# Patient Record
Sex: MALE | Race: WHITE | Employment: FULL TIME | URBAN - METROPOLITAN AREA
[De-identification: names, ages, dates, MRNs, and addresses within clinical notes are randomized per-mention and may not be internally consistent; named-entity substitution may affect disease eponyms.]

---

## 2024-01-14 ENCOUNTER — HOSPITAL ENCOUNTER (OUTPATIENT)
Dept: RADIOLOGY | Facility: EXTERNAL LOCATION | Age: 34
Discharge: HOME | End: 2024-01-14

## 2024-01-14 DIAGNOSIS — R07.81 RIB PAIN ON LEFT SIDE: ICD-10-CM

## 2024-01-14 DIAGNOSIS — M25.512 LEFT SHOULDER PAIN, UNSPECIFIED CHRONICITY: ICD-10-CM

## 2024-01-17 ENCOUNTER — OFFICE VISIT (OUTPATIENT)
Dept: ORTHOPEDIC SURGERY | Facility: CLINIC | Age: 34
End: 2024-01-17
Payer: MEDICARE

## 2024-01-17 ENCOUNTER — APPOINTMENT (OUTPATIENT)
Dept: ORTHOPEDIC SURGERY | Facility: HOSPITAL | Age: 34
End: 2024-01-17
Payer: COMMERCIAL

## 2024-01-17 VITALS — WEIGHT: 202 LBS | HEIGHT: 70 IN | BODY MASS INDEX: 28.92 KG/M2

## 2024-01-17 DIAGNOSIS — S40.011A CONTUSION OF MULTIPLE SITES OF RIGHT SHOULDER AND UPPER ARM, INITIAL ENCOUNTER: ICD-10-CM

## 2024-01-17 DIAGNOSIS — S40.021A CONTUSION OF MULTIPLE SITES OF RIGHT SHOULDER AND UPPER ARM, INITIAL ENCOUNTER: ICD-10-CM

## 2024-01-17 DIAGNOSIS — M25.512 LEFT SHOULDER PAIN, UNSPECIFIED CHRONICITY: ICD-10-CM

## 2024-01-17 DIAGNOSIS — M75.82 ROTATOR CUFF TENDINITIS, LEFT: ICD-10-CM

## 2024-01-17 DIAGNOSIS — S40.012A CONTUSION OF LEFT SHOULDER, INITIAL ENCOUNTER: Primary | ICD-10-CM

## 2024-01-17 PROCEDURE — 99203 OFFICE O/P NEW LOW 30 MIN: CPT | Performed by: ORTHOPAEDIC SURGERY
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
## 2024-01-19 ENCOUNTER — APPOINTMENT (OUTPATIENT)
Dept: PHYSICAL THERAPY | Facility: CLINIC | Age: 34
End: 2024-01-19
Payer: COMMERCIAL

## 2024-01-19 ENCOUNTER — EVALUATION (OUTPATIENT)
Dept: PHYSICAL THERAPY | Facility: CLINIC | Age: 34
End: 2024-01-19
Payer: COMMERCIAL

## 2024-01-19 DIAGNOSIS — S40.012A CONTUSION OF LEFT SHOULDER, INITIAL ENCOUNTER: Primary | ICD-10-CM

## 2024-01-19 DIAGNOSIS — M75.82 ROTATOR CUFF TENDINITIS, LEFT: ICD-10-CM

## 2024-01-19 PROCEDURE — 97161 PT EVAL LOW COMPLEX 20 MIN: CPT | Mod: GP | Performed by: PHYSICAL THERAPIST

## 2024-01-19 PROCEDURE — 97110 THERAPEUTIC EXERCISES: CPT | Mod: GP | Performed by: PHYSICAL THERAPIST

## 2024-01-19 ASSESSMENT — PATIENT HEALTH QUESTIONNAIRE - PHQ9
SUM OF ALL RESPONSES TO PHQ9 QUESTIONS 1 AND 2: 0
1. LITTLE INTEREST OR PLEASURE IN DOING THINGS: NOT AT ALL
2. FEELING DOWN, DEPRESSED OR HOPELESS: NOT AT ALL

## 2024-01-19 ASSESSMENT — ENCOUNTER SYMPTOMS
DEPRESSION: 0
LOSS OF SENSATION IN FEET: 0
OCCASIONAL FEELINGS OF UNSTEADINESS: 0

## 2024-01-19 ASSESSMENT — PAIN - FUNCTIONAL ASSESSMENT: PAIN_FUNCTIONAL_ASSESSMENT: 0-10
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
## 2024-01-22 ENCOUNTER — TREATMENT (OUTPATIENT)
Dept: PHYSICAL THERAPY | Facility: CLINIC | Age: 34
End: 2024-01-22
Payer: COMMERCIAL

## 2024-01-22 DIAGNOSIS — S40.012A CONTUSION OF LEFT SHOULDER, INITIAL ENCOUNTER: ICD-10-CM

## 2024-01-22 DIAGNOSIS — M75.82 ROTATOR CUFF TENDINITIS, LEFT: ICD-10-CM

## 2024-01-22 PROCEDURE — 97110 THERAPEUTIC EXERCISES: CPT | Mod: GP | Performed by: PHYSICAL THERAPIST

## 2024-01-22 ASSESSMENT — PAIN - FUNCTIONAL ASSESSMENT: PAIN_FUNCTIONAL_ASSESSMENT: 0-10

## 2024-01-22 ASSESSMENT — PAIN SCALES - GENERAL: PAINLEVEL_OUTOF10: 5 - MODERATE PAIN
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
## 2024-01-24 ENCOUNTER — TREATMENT (OUTPATIENT)
Dept: PHYSICAL THERAPY | Facility: CLINIC | Age: 34
End: 2024-01-24
Payer: COMMERCIAL

## 2024-01-24 DIAGNOSIS — S40.012A CONTUSION OF LEFT SHOULDER, INITIAL ENCOUNTER: ICD-10-CM

## 2024-01-24 DIAGNOSIS — M75.82 ROTATOR CUFF TENDINITIS, LEFT: ICD-10-CM

## 2024-01-24 PROCEDURE — 97014 ELECTRIC STIMULATION THERAPY: CPT | Mod: GP | Performed by: PHYSICAL THERAPIST

## 2024-01-24 PROCEDURE — 97110 THERAPEUTIC EXERCISES: CPT | Mod: GP | Performed by: PHYSICAL THERAPIST

## 2024-01-24 ASSESSMENT — PAIN - FUNCTIONAL ASSESSMENT: PAIN_FUNCTIONAL_ASSESSMENT: 0-10

## 2024-01-24 ASSESSMENT — PAIN SCALES - GENERAL: PAINLEVEL_OUTOF10: 5 - MODERATE PAIN

## 2024-01-24 NOTE — PROGRESS NOTES
Physical Therapy        Physical Therapy    Physical Therapy Treatment      Patient Name: Jj Avitia  MRN: [id number]  Today's Date: 1/24/2024  Time Calculation  Start Time: 0825  Stop Time: 0908  Time Calculation (min): 43 min    Assessment:    Decreased resistance with theraband exercises to green to help decrease soreness. Added ES after treatment to assist in decreasing post exercise soreness.    Plan:   Left shoulder ROM, scapular strengthening and RC strengthening as tolerated without aggravating shoulder pain.    Current Problem:   1. Contusion of left shoulder, initial encounter  Follow Up In Physical Therapy      2. Rotator cuff tendinitis, left  Follow Up In Physical Therapy            Subjective    Pt arrived 10 mins late to appointment. Reports that his left shoulder feels very tired and sore after exercises. Pain is in left shoulder and left superior scapular region.    Pt's goal: To be pain free     Precautions:  Precautions  Precautions Comment: none       Pain:  Pain Assessment  Pain Assessment: 0-10  Pain Score: 5 - Moderate pain  Pain Location: Shoulder  Pain Orientation: Left    Prior Level of Function:   Independent    Objective   Cognition:   WNL  Shoulder       Shoulder palpation/Joint Assessment   Tenderness in left anterior shoulder to palpation  Cervical AROM   WNL in all planes  Shoulder AROM           Treatments:  EXERCISE Date 1/19/24 Date 1/22/24 Date 1/24/24 Date    REPS REPS REPS REPS   HP left shoulder  10 mins 10 mins     UE Bike              Pulleys      Flexion 2 mins 3 mins 3 mins    Pulleys      Scaption/Abduction 2 mins 3 mins 3 mins    Pulleys      IR 20X 20X 20X           Tband       Pull down  Blue 3 X 10 Hold today    Tband       Mid rows  Blue  3 X 10 GRN  3 X 10    Tband       IR  Blue  3 X 10 GRN  3 X 10    Tband       ER   GRN  3 X 10                  Wand-supine flexion   2 X 10    Wand-supine ER   2 X 10    I T Y (kezia)                                                                                            CP left shoulder 10 mins  CP  and ES 15 mins         EDUCATION:    Goals:  1.Pt will have full AROM left shoulder to facilitate return to prior functional level-4 weeks    2.Pt will have 5/5 left UE strength in all planes to facilitate return to prior functional level.-4 weeks    3.Improve Quickdash score to < 20 so that pAccess Code: [email or web address]  URL: [email or web address]  Date: 01/24/2024  Prepared by: Darlene Feliz    Exercises  - Shoulder External Rotation with Anchored Resistance  - 1 x daily - 7 x weekly - 3 sets - 10 reps  - Supine Shoulder Flexion Extension AAROM with Dowel  - 1 x daily - 7 x weekly - 2 sets - 10 reps  - Supine Shoulder External Rotation with Dowel  - 1 x daily - 7 x weekly - 2 sets - 10 repsatient can return to his previous activity level-4 weeks    4.Pt will be independent with HEP-4 weeks

## 2024-02-01 ENCOUNTER — TREATMENT (OUTPATIENT)
Dept: PHYSICAL THERAPY | Facility: CLINIC | Age: 34
End: 2024-02-01
Payer: MEDICARE

## 2024-02-01 DIAGNOSIS — M75.82 ROTATOR CUFF TENDINITIS, LEFT: ICD-10-CM

## 2024-02-01 DIAGNOSIS — S40.012A CONTUSION OF LEFT SHOULDER, INITIAL ENCOUNTER: ICD-10-CM

## 2024-02-01 PROCEDURE — 97014 ELECTRIC STIMULATION THERAPY: CPT | Mod: GP,CQ

## 2024-02-01 PROCEDURE — 97110 THERAPEUTIC EXERCISES: CPT | Mod: GP,CQ

## 2024-02-01 ASSESSMENT — PAIN SCALES - GENERAL: PAINLEVEL_OUTOF10: 4

## 2024-02-01 ASSESSMENT — PAIN - FUNCTIONAL ASSESSMENT: PAIN_FUNCTIONAL_ASSESSMENT: 0-10

## 2024-02-01 NOTE — PROGRESS NOTES
Physical Therapy      Physical Therapy Treatment      Patient Name: Jj Avitia  MRN: [id number]  Today's Date: 2/1/2024  Time Calculation  Start Time: 0340  Stop Time: 0425  Time Calculation (min): 45 min    Assessment:    Resumed T-band extension. He has been doing this exercise at home (occasionally) without    an increase in pain. Left shoulder abduction AROM has improved since the IE.    Plan:   Left shoulder ROM, scapular strengthening and RC strengthening as tolerated without aggravating shoulder pain.    Current Problem:   1. Contusion of left shoulder, initial encounter  Follow Up In Physical Therapy      2. Rotator cuff tendinitis, left  Follow Up In Physical Therapy          Subjective    Pt. shoulder was very painful over the weekend. Feeling a little better today. Pt. Has a follow up appointment with his . on 2/28/24.  Pt. was 10 minutes late to his PT appointment.    Pt's goal: To be pain free     Precautions:   Precautions  Precautions Comment: none     Pain:  Pain Assessment  Pain Assessment: 0-10  Pain Score: 4  Pain Location: Shoulder  Pain Orientation: Left    Objective    Shoulder palpation/Joint Assessment   Tenderness in left anterior shoulder to palpation    Cervical AROM   WNL in all planes    Left shoulder AROM   Flexion 120 deg   Abduction 123 deg      Treatments:  EXERCISE Date 1/19/24 Date 1/22/24 Date 1/24/24 Date 2/1/24    REPS REPS REPS REPS   HP left shoulder  10 mins 10 mins     UE Bike              Pulleys      Flexion 2 mins 3 mins 3 mins 3 min   Pulleys      Scaption/Abduction 2 mins 3 mins 3 mins 3 min   Pulleys      IR 20X 20X 20X 20x          Tband       Pull down  Blue 3 X 10 Hold today Green 20x   Tband       Mid rows  Blue  3 X 10 GRN  3 X 10 Green 20x   Tband       IR  Blue  3 X 10 GRN  3 X 10 Green 20x   Tband       ER   GRN  3 X 10 Green 20x                 Wand-supine flexion   2 X 10 2x10   Wand-supine ER   2 X 10 2x10   I T Y (kezia)                                                                                            CP left shoulder 10 mins  CP  and ES 15 mins CP and ES 15 min      Goals:  1.Pt will have full AROM left shoulder to facilitate return to prior functional level-4 weeks    2.Pt will have 5/5 left UE strength in all planes to facilitate return to prior functional level.-4 weeks    3.Improve Quickdash score to < 20 so that pAccess Code: [email or web address]  URL: [email or web address]  Date: 01/24/2024  Prepared by: Darlene Feliz    Exercises  - Shoulder External Rotation with Anchored Resistance  - 1 x daily - 7 x weekly - 3 sets - 10 reps  - Supine Shoulder Flexion Extension AAROM with Dowel  - 1 x daily - 7 x weekly - 2 sets - 10 reps  - Supine Shoulder External Rotation with Dowel  - 1 x daily - 7 x weekly - 2 sets - 10 repsatient can return to his previous activity level-4 weeks    4.Pt will be independent with HEP-4 weeks

## 2024-02-02 ENCOUNTER — TREATMENT (OUTPATIENT)
Dept: PHYSICAL THERAPY | Facility: CLINIC | Age: 34
End: 2024-02-02
Payer: MEDICARE

## 2024-02-02 DIAGNOSIS — S40.012A CONTUSION OF LEFT SHOULDER, INITIAL ENCOUNTER: ICD-10-CM

## 2024-02-02 DIAGNOSIS — M75.82 ROTATOR CUFF TENDINITIS, LEFT: ICD-10-CM

## 2024-02-02 PROCEDURE — 97110 THERAPEUTIC EXERCISES: CPT | Mod: GP,CQ

## 2024-02-02 ASSESSMENT — PAIN - FUNCTIONAL ASSESSMENT: PAIN_FUNCTIONAL_ASSESSMENT: 0-10

## 2024-02-02 ASSESSMENT — PAIN SCALES - GENERAL: PAINLEVEL_OUTOF10: 3

## 2024-02-02 NOTE — PROGRESS NOTES
"Physical Therapy      Physical Therapy Treatment      Patient Name: Jj Avitia  MRN: [id number]  Today's Date: 2/2/2024  Time Calculation  Start Time: 1035  Stop Time: 1120  Time Calculation (min): 45 min    Assessment:    Pt. reports that he had a \"grinding\" feeling in his left shoulder with T-band rows. His left UE was \"shaky\" while doing hughston I andT secondary to fatigue. No increase in pain after completing exercise program.  Declined IFC estim today. It \"feels good\" but does not seem to be helpful overall.    Plan:   Left shoulder ROM, scapular strengthening and RC strengthening as tolerated without aggravating shoulder pain.    Current Problem:   1. Contusion of left shoulder, initial encounter  Follow Up In Physical Therapy      2. Rotator cuff tendinitis, left  Follow Up In Physical Therapy          Subjective    Pt. shoulder feels ok today. 3/10 pain with his arm resting at his side. Pt. was 15 minutes late to his PT appointment.    Pt's goal: To be pain free     Precautions:   Precautions  Precautions Comment: none     Pain:  Pain Assessment  Pain Assessment: 0-10  Pain Score: 3  Pain Location: Shoulder  Pain Orientation: Left    Objective    Cervical AROM   WNL in all planes    Left shoulder AROM   Flexion 120 deg   Abduction 123 deg  Left shoulder AAROM   Flexion 154 deg (supine)*discomfort    Added: [place] I and T      Treatments:  EXERCISE Date 2/2/24 Date  Date  Date     REPS REPS REPS REPS   HP left shoulder  NT      UE Bike              Pulleys      Flexion 3 mins      Pulleys      Scaption/Abduction 3 mins      Pulleys      IR 20X 5\"H             Tband       Pull down Green 3x10      Tband       Mid rows Green 3x10      Tband       IR Green 3x10      Tband       ER Green 3x10                    Wand-supine flexion 2x10      Wand-supine ER 2x10      I T Y (jaime[place]ston) I, T prone 2x10                                                                                          CP left shoulder " with IFC 5 CP only         Goals:  1.Pt will have full AROM left shoulder to facilitate return to prior functional level-4 weeks    2.Pt will have 5/5 left UE strength in all planes to facilitate return to prior functional level.-4 weeks    3.Improve Quickdash score to < 20 so that pAccess Code: [email or web address]  URL: [email or web address]  Date: 01/24/2024  Prepared by: Darlene Feliz    Exercises  - Shoulder External Rotation with Anchored Resistance  - 1 x daily - 7 x weekly - 3 sets - 10 reps  - Supine Shoulder Flexion Extension AAROM with Dowel  - 1 x daily - 7 x weekly - 2 sets - 10 reps  - Supine Shoulder External Rotation with Dowel  - 1 x daily - 7 x weekly - 2 sets - 10 repsatient can return to his previous activity level-4 weeks    4.Pt will be independent with HEP-4 weeks

## 2024-02-06 ENCOUNTER — APPOINTMENT (OUTPATIENT)
Dept: PHYSICAL THERAPY | Facility: CLINIC | Age: 34
End: 2024-02-06
Payer: MEDICARE

## 2024-02-07 ENCOUNTER — HOSPITAL ENCOUNTER (EMERGENCY)
Facility: HOSPITAL | Age: 34
Discharge: HOME | End: 2024-02-07
Attending: STUDENT IN AN ORGANIZED HEALTH CARE EDUCATION/TRAINING PROGRAM
Payer: MEDICARE

## 2024-02-07 ENCOUNTER — APPOINTMENT (OUTPATIENT)
Dept: RADIOLOGY | Facility: HOSPITAL | Age: 34
End: 2024-02-07
Payer: MEDICARE

## 2024-02-07 ENCOUNTER — APPOINTMENT (OUTPATIENT)
Dept: CARDIOLOGY | Facility: HOSPITAL | Age: 34
End: 2024-02-07
Payer: MEDICARE

## 2024-02-07 VITALS
HEART RATE: 74 BPM | OXYGEN SATURATION: 98 % | BODY MASS INDEX: 28.63 KG/M2 | HEIGHT: 70 IN | RESPIRATION RATE: 20 BRPM | SYSTOLIC BLOOD PRESSURE: 130 MMHG | WEIGHT: 200 LBS | DIASTOLIC BLOOD PRESSURE: 77 MMHG | TEMPERATURE: 98.6 F

## 2024-02-07 DIAGNOSIS — R07.89 COSTOCHONDRAL CHEST PAIN: Primary | ICD-10-CM

## 2024-02-07 PROCEDURE — 93005 ELECTROCARDIOGRAM TRACING: CPT

## 2024-02-07 PROCEDURE — 99283 EMERGENCY DEPT VISIT LOW MDM: CPT

## 2024-02-07 PROCEDURE — 71101 X-RAY EXAM UNILAT RIBS/CHEST: CPT | Mod: LEFT SIDE | Performed by: RADIOLOGY

## 2024-02-07 PROCEDURE — 99283 EMERGENCY DEPT VISIT LOW MDM: CPT | Mod: 25 | Performed by: STUDENT IN AN ORGANIZED HEALTH CARE EDUCATION/TRAINING PROGRAM

## 2024-02-07 PROCEDURE — 71101 X-RAY EXAM UNILAT RIBS/CHEST: CPT | Mod: LT

## 2024-02-07 PROCEDURE — 2500000001 HC RX 250 WO HCPCS SELF ADMINISTERED DRUGS (ALT 637 FOR MEDICARE OP): Performed by: STUDENT IN AN ORGANIZED HEALTH CARE EDUCATION/TRAINING PROGRAM

## 2024-02-07 RX ORDER — NAPROXEN 500 MG/1
500 TABLET ORAL
Qty: 30 TABLET | Refills: 0 | Status: SHIPPED | OUTPATIENT
Start: 2024-02-07 | End: 2024-02-22

## 2024-02-07 RX ORDER — IBUPROFEN 600 MG/1
600 TABLET ORAL ONCE
Status: COMPLETED | OUTPATIENT
Start: 2024-02-07 | End: 2024-02-07

## 2024-02-07 RX ADMIN — IBUPROFEN 600 MG: 600 TABLET, FILM COATED ORAL at 09:58

## 2024-02-07 ASSESSMENT — COLUMBIA-SUICIDE SEVERITY RATING SCALE - C-SSRS
2. HAVE YOU ACTUALLY HAD ANY THOUGHTS OF KILLING YOURSELF?: NO
1. IN THE PAST MONTH, HAVE YOU WISHED YOU WERE DEAD OR WISHED YOU COULD GO TO SLEEP AND NOT WAKE UP?: NO
6. HAVE YOU EVER DONE ANYTHING, STARTED TO DO ANYTHING, OR PREPARED TO DO ANYTHING TO END YOUR LIFE?: NO

## 2024-02-07 ASSESSMENT — PAIN SCALES - GENERAL: PAINLEVEL_OUTOF10: 9

## 2024-02-07 ASSESSMENT — PAIN - FUNCTIONAL ASSESSMENT: PAIN_FUNCTIONAL_ASSESSMENT: 0-10

## 2024-02-07 ASSESSMENT — LIFESTYLE VARIABLES
HAVE PEOPLE ANNOYED YOU BY CRITICIZING YOUR DRINKING: NO
EVER FELT BAD OR GUILTY ABOUT YOUR DRINKING: NO
EVER HAD A DRINK FIRST THING IN THE MORNING TO STEADY YOUR NERVES TO GET RID OF A HANGOVER: NO
HAVE YOU EVER FELT YOU SHOULD CUT DOWN ON YOUR DRINKING: NO

## 2024-02-07 NOTE — DISCHARGE INSTRUCTIONS
Please return to the emergency department if you have uncontrolled pain, shortness of breath, lightheadedness.  Please follow-up with your primary care provider.
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
## 2024-02-08 ENCOUNTER — APPOINTMENT (OUTPATIENT)
Dept: PHYSICAL THERAPY | Facility: CLINIC | Age: 34
End: 2024-02-08
Payer: MEDICARE

## 2024-02-10 LAB
ATRIAL RATE: 71 BPM
P AXIS: 31 DEGREES
PR INTERVAL: 155 MS
Q ONSET: 249 MS
QRS COUNT: 11 BEATS
QRS DURATION: 100 MS
QT INTERVAL: 380 MS
QTC CALCULATION(BAZETT): 413 MS
QTC FREDERICIA: 402 MS
R AXIS: 59 DEGREES
T AXIS: 48 DEGREES
T OFFSET: 439 MS
VENTRICULAR RATE: 71 BPM

## 2024-02-13 ENCOUNTER — TREATMENT (OUTPATIENT)
Dept: PHYSICAL THERAPY | Facility: CLINIC | Age: 34
End: 2024-02-13
Payer: MEDICARE

## 2024-02-13 DIAGNOSIS — S40.012A CONTUSION OF LEFT SHOULDER, INITIAL ENCOUNTER: ICD-10-CM

## 2024-02-13 DIAGNOSIS — M75.82 ROTATOR CUFF TENDINITIS, LEFT: ICD-10-CM

## 2024-02-13 PROCEDURE — 97110 THERAPEUTIC EXERCISES: CPT | Mod: GP,CQ

## 2024-02-13 ASSESSMENT — PAIN - FUNCTIONAL ASSESSMENT: PAIN_FUNCTIONAL_ASSESSMENT: 0-10

## 2024-02-13 ASSESSMENT — PAIN SCALES - GENERAL: PAINLEVEL_OUTOF10: 3

## 2024-02-13 ASSESSMENT — PAIN DESCRIPTION - DESCRIPTORS: DESCRIPTORS: ACHING

## 2024-02-13 NOTE — PROGRESS NOTES
"Physical Therapy      Physical Therapy Treatment      Patient Name: Jj Avitia  MRN: [id number]  Today's Date: 2/13/2024  Time Calculation  Start Time: 0845  Stop Time: 0935  Time Calculation (min): 50 min    Assessment:   Pt. Reports having a \"clicking feeling\" in his left shoulder while doing shoulder AROM scaption to 90 deg. He tolerated all other exercises well. Pt. notices a significant difference in left UE verse right UE strength while doing overhead exercises. CP at end of session.    Plan:   Left shoulder ROM, scapular strengthening and RC strengthening as tolerated without aggravating shoulder pain.    Monitor symptoms.    Current Problem:   1. Contusion of left shoulder, initial encounter  Follow Up In Physical Therapy      2. Rotator cuff tendinitis, left  Follow Up In Physical Therapy          Subjective   Pt. Reports that he had pain in the left clavicle and ribs the day after his last PT visit. His pain continued to get worse over the next few days so he went to the ED. He was diagnosed with a muscle strain and prescribed muscle relaxer's and Motrin. He noticed an immediate improvement in pain after taking the Motrin. Feeling pretty good today.     Pt's goal: To be pain free     Precautions:   Precautions  Precautions Comment: none     Pain:  Pain Assessment  Pain Assessment: 0-10  Pain Score: 3  Pain Location: Shoulder  Pain Orientation: Left  Pain Descriptors: Aching    Objective    Left shoulder AROM   Flexion 120 deg   Abduction 123 deg   Left shoulder AAROM   Flexion 154 deg (supine)*discomfort    Added T-band adduction and shoulder AROM flexion/scaption to 90 deg      Treatments:  EXERCISE Date 2/2/24 Date 2/13/24 Date  Date     REPS REPS REPS REPS   HP left shoulder  NT NT     UE Bike       Wall ladder flexion  10x10\"H     Pulleys      Flexion 3 mins 3 min     Pulleys      Scaption/Abduction 3 mins 3 min     Pulleys      IR 20X 5\"H 20x 5\"H            Tband       Pull down Green 3x10 Green " "3x10     Tband       Mid rows Green 3x10 Green 3x10     Tband       IR Green 3x10 Green 2x15     Tband       ER Green 3x10 Green 2x15     T band      Adduction  Green 3x10            Wand-seated flexion 2x10 10x10H     Wand-seated ER 2x10 10x10\"H     I T Y ([facility]) I, T prone 2x10 ---            BTE ROM              Left shoulder AROM to 90 deg:       flexion  2x10     scaption  1x5 discomfort                                               CP left shoulder with IFC 5 CP only         Goals:  1.Pt will have full AROM left shoulder to facilitate return to prior functional level-4 weeks    2.Pt will have 5/5 left UE strength in all planes to facilitate return to prior functional level.-4 weeks    3.Improve Quickdash score to < 20   "

## 2024-02-16 ENCOUNTER — TREATMENT (OUTPATIENT)
Dept: PHYSICAL THERAPY | Facility: CLINIC | Age: 34
End: 2024-02-16
Payer: MEDICARE

## 2024-02-16 DIAGNOSIS — S40.012D CONTUSION OF LEFT SHOULDER, SUBSEQUENT ENCOUNTER: Primary | ICD-10-CM

## 2024-02-16 DIAGNOSIS — S40.012A CONTUSION OF LEFT SHOULDER, INITIAL ENCOUNTER: ICD-10-CM

## 2024-02-16 DIAGNOSIS — M75.82 ROTATOR CUFF TENDINITIS, LEFT: ICD-10-CM

## 2024-02-16 PROCEDURE — 97110 THERAPEUTIC EXERCISES: CPT | Mod: GP | Performed by: PHYSICAL THERAPIST

## 2024-02-16 ASSESSMENT — PAIN - FUNCTIONAL ASSESSMENT: PAIN_FUNCTIONAL_ASSESSMENT: 0-10

## 2024-02-16 ASSESSMENT — PAIN DESCRIPTION - DESCRIPTORS: DESCRIPTORS: ACHING

## 2024-02-16 ASSESSMENT — PAIN SCALES - GENERAL: PAINLEVEL_OUTOF10: 2

## 2024-02-16 NOTE — PROGRESS NOTES
"Physical Therapy      Physical Therapy Treatment      Patient Name: Jj Avitia  MRN: [id number]  Today's Date: 2/16/2024  Time Calculation  Start Time: 0954  Stop Time: 1040  Time Calculation (min): 46 min      Visit: 7  Auth: No auth required    Assessment:   Significant improvement in left shoulder AROM since evaluation. Good tolerance to progression of strengthening exercises.    Plan:   Left shoulder ROM, scapular strengthening and RC strengthening as tolerated without aggravating shoulder pain.    Monitor symptoms.    Current Problem:   1. Contusion of left shoulder, subsequent encounter        2. Rotator cuff tendinitis, left            Subjective   Pt. Reports that his shoulder is feeling good today. Left shoulder pain waxes and wanes but overall improvement. Continues to notice weakness when reaching overhead.     Pt's goal: To be pain free     Precautions:  Precautions  Precautions Comment: nonePrecautions  Precautions Comment: none     Pain:  Pain Assessment  Pain Assessment: 0-10  Pain Score: 2  Pain Location: Shoulder  Pain Orientation: Left  Pain Descriptors: Aching    Objective    Left shoulder AROM   Flexion 152 deg   Abduction 141deg            Treatments:  EXERCISE Date 2/2/24 Date 2/13/24 Date 2/16/247 Date     REPS REPS REPS REPS   HP left shoulder  NT NT     UE Bike       Wall ladder flexion  10x10\"H     Pulleys      Flexion 3 mins 3 min 3 mins    Pulleys      Scaption/Abduction 3 mins 3 min 3 mins    Pulleys      IR 20X 5\"H 20x 5\"H 20X           Tband       Pull down Green 3x10 Green 3x10 Blue  3 X 10    Tband       Mid rows Green 3x10 Green 3x10 Blue   3 X 10    Tband       IR Green 3x10 Green 2x15 Blue  3 X 10    Tband       ER Green 3x10 Green 2x15 Blue  3 X 10    T band      Adduction  Green 3x10 Blue  3 X 10           Wand-seated flexion 2x10 10x10H home    Wand-seated ER 2x10 10x10\"H home    I T Y ([facility]) I, T prone 2x10 ---            BTE ROM   T  39 90 ea    BTE   H. Push/pull   " "T 81  90\"    BTE  V. Push/pull   T 81  90\"    flexion  2x10     scaption  1x5 discomfort            Ball on wall   2#  A-Z           Sidelying ER   next                         CP left shoulder with IFC 5 CP only  CP only 10 mins       Goals:  1.Pt will have full AROM left shoulder to facilitate return to prior functional level-4 weeks    2.Pt will have 5/5 left UE strength in all planes to facilitate return to prior functional level.-4 weeks    3.Improve Quickdash score to < 20      "

## 2024-02-19 ENCOUNTER — TREATMENT (OUTPATIENT)
Dept: PHYSICAL THERAPY | Facility: CLINIC | Age: 34
End: 2024-02-19
Payer: MEDICARE

## 2024-02-19 DIAGNOSIS — S40.012A CONTUSION OF LEFT SHOULDER, INITIAL ENCOUNTER: ICD-10-CM

## 2024-02-19 DIAGNOSIS — S40.012D CONTUSION OF LEFT SHOULDER, SUBSEQUENT ENCOUNTER: ICD-10-CM

## 2024-02-19 DIAGNOSIS — M75.82 ROTATOR CUFF TENDINITIS, LEFT: Primary | ICD-10-CM

## 2024-02-19 PROCEDURE — 97110 THERAPEUTIC EXERCISES: CPT | Mod: GP | Performed by: PHYSICAL THERAPIST

## 2024-02-19 ASSESSMENT — PAIN SCALES - GENERAL: PAINLEVEL_OUTOF10: 2

## 2024-02-19 ASSESSMENT — PAIN - FUNCTIONAL ASSESSMENT: PAIN_FUNCTIONAL_ASSESSMENT: 0-10

## 2024-02-19 NOTE — PROGRESS NOTES
"Physical Therapy      Physical Therapy Treatment      Patient Name: Jj Avitia  MRN: [id number]  Today's Date: 2/19/2024  Time Calculation  Start Time: 0945  Stop Time: 1027  Time Calculation (min): 42 min      Visit: 8  Auth: No auth required    Assessment:   No increase in pain with progression of strengthening exercises.    Plan:   Left shoulder ROM, scapular strengthening and RC strengthening as tolerated without aggravating shoulder pain.    Monitor symptoms.    Current Problem:   1. Rotator cuff tendinitis, left  Follow Up In Physical Therapy      2. Contusion of left shoulder, subsequent encounter        3. Contusion of left shoulder, initial encounter  Follow Up In Physical Therapy          Subjective   Pt. Reports that he had some clavicular soreness after last visit, but it resolved after one day.     Pt's goal: To be pain free     Precautions:  Precautions  Precautions Comment: nonePrecautions  Precautions Comment: none     Pain:  Pain Assessment  Pain Assessment: 0-10  Pain Score: 2  Pain Location: Shoulder  Pain Orientation: Left    Objective    Left shoulder AROM   Flexion 152 deg   Abduction 141deg            Treatments:  EXERCISE Date 2/2/24 Date 2/13/24 Date 2/16/24 Date 2/19/24    REPS REPS REPS REPS   HP left shoulder  NT NT     UE Bike       Wall ladder flexion  10x10\"H     Pulleys      Flexion 3 mins 3 min 3 mins 3 mins   Pulleys      Scaption/Abduction 3 mins 3 min 3 mins 3 mins   Pulleys      IR 20X 5\"H 20x 5\"H 20X 20X          Tband       Pull down Green 3x10 Green 3x10 Blue  3 X 10 Blue 3 X 10   Tband       Mid rows Green 3x10 Green 3x10 Blue   3 X 10 Blue 3 X 10   Tband       IR Green 3x10 Green 2x15 Blue  3 X 10 Blue 3 X 10   Tband       ER Green 3x10 Green 2x15 Blue  3 X 10 Blue 3 X 10   T band      Adduction  Green 3x10 Blue  3 X 10 Blue 3 X 10          Wand-seated flexion 2x10 10x10H home    Wand-seated ER 2x10 10x10\"H home    I T Y ([facility]) I, T prone 2x10 ---            BTE ROM " "  T  39 90 ea T 42  90\" ea   BTE   H. Push/pull   T 81  90\" T 84  90\"   BTE  V. Push/pull   T 81  90\" T 84  90\"   BTE IR/ER  2x10  T 36  90\"   scaption  1x5 discomfort            Ball on wall   2#  A-Z 2#  A-Z          Sidelying ER   next                         CP left shoulder with IFC 5 CP only  CP only 10 mins CP 10 mins      Goals:  1.Pt will have full AROM left shoulder to facilitate return to prior functional level-4 weeks    2.Pt will have 5/5 left UE strength in all planes to facilitate return to prior functional level.-4 weeks    3.Improve Quickdash score to < 20      "

## 2024-02-22 ENCOUNTER — TREATMENT (OUTPATIENT)
Dept: PHYSICAL THERAPY | Facility: CLINIC | Age: 34
End: 2024-02-22
Payer: MEDICARE

## 2024-02-22 DIAGNOSIS — M75.82 ROTATOR CUFF TENDINITIS, LEFT: ICD-10-CM

## 2024-02-22 DIAGNOSIS — S40.012A CONTUSION OF LEFT SHOULDER, INITIAL ENCOUNTER: Primary | ICD-10-CM

## 2024-02-22 PROCEDURE — 97110 THERAPEUTIC EXERCISES: CPT | Mod: GP | Performed by: PHYSICAL THERAPIST

## 2024-02-22 NOTE — PROGRESS NOTES
Physical Therapy      Physical Therapy Treatment/Reassessment     Patient Name: Jj Avitia  MRN: [id number]  Today's Date: 2/22/2024  Time Calculation  Start Time: 0235  Stop Time: 0335  Time Calculation (min): 60 min      Visit: 9  Auth: No auth required    Assessment:   Significant improvement in left shoulder AROM and strength since starting PT. ROM of left shoulder not equal to right and has some mild inferior instability. Needs continued PT to restore to prior level of function.    Plan:   Left shoulder ROM, scapular strengthening and RC strengthening  and stabilization exercises as tolerated without aggravating shoulder pain.        Current Problem:   1. Contusion of left shoulder, initial encounter  Follow Up In Physical Therapy    Follow Up In Physical Therapy      2. Rotator cuff tendinitis, left  Follow Up In Physical Therapy    Follow Up In Physical Therapy          Subjective   Pt. Reports that he has good strength within a limited range, but doesn't have full strength in full ROM. Doesn't feel like he could swing a golf club or hit volleyball with it yet.  Left shoulder pain ranges from 2-7/10. Painful if he sleeps on it. Pt reports that he occasionally has paresthesias in his left UE when he coughs. Not currently taking any medication for shoulder pain. Hasn't been doing any remodeling recently.     Pt's goal: To be pain free     Precautions:  Precautions  Precautions Comment: nonePrecautions  Precautions Comment: none     Pain:       Objective    Left shoulder AROM   Flexion 156 deg   Abduction 155 deg  IR 80 degrees  ER 75 degrees    Left shoulder strength:   Flexion 5/5  Abduction 5/5  IR 5/5  ER 5/5    Cervical ROM:   WNL in all planes    Special tests:  + impingement left shoulder  Mild inferior instability    OUTCOME MEASURE:  Quickdash 23 (raw score)            Treatments:  EXERCISE Date 2/22/24       REPS      HP left shoulder  NT             Wall ladder flexion       Pulleys      Flexion 3  "mins      Pulleys      Scaption/Abduction 3 mins      Pulleys      IR 20X 5\"H             Tband       Pull down Blue 3x10      Tband       Mid rows Blue 3x10      Tband       IR Blue 3x10      Tband       ER Blue 3x10      T band      Adduction              Body blade 30\" H & V-2 positions      Wall push up with plus 30X      I T Y ([facility]) I, T prone 2x10             BTE ROM T 42  90\" ea      BTE   H. Push/pull T 99 90\" ea      BTE  V. Push/pull T  99  90\" ea      BTE IR/ER T 36  90\"      scaption              Ball on wall 2#  A-Z             Sidelying ER                            CP left shoulder with IFC 5 CP only  CP only 10 mins CP 10 mins      Goals:  1.Pt will have full AROM left shoulder to facilitate return to prior functional level-4 weeks-2/22/24 progressing    2.Pt will have 5/5 left UE strength in all planes to facilitate return to prior functional level.-4 weeks-goal met    3.Improve Quickdash score to < 20-goal met    4. Pt will have normal dynamic stability of left shoulder so he can return to prior level of function-goal set 2/22/24-4 weeks      "

## 2024-02-26 ENCOUNTER — TREATMENT (OUTPATIENT)
Dept: PHYSICAL THERAPY | Facility: CLINIC | Age: 34
End: 2024-02-26
Payer: MEDICARE

## 2024-02-26 DIAGNOSIS — M75.82 ROTATOR CUFF TENDINITIS, LEFT: ICD-10-CM

## 2024-02-26 DIAGNOSIS — S40.012A CONTUSION OF LEFT SHOULDER, INITIAL ENCOUNTER: ICD-10-CM

## 2024-02-26 PROCEDURE — 97110 THERAPEUTIC EXERCISES: CPT | Mod: GP | Performed by: PHYSICAL THERAPIST

## 2024-02-26 ASSESSMENT — PAIN - FUNCTIONAL ASSESSMENT: PAIN_FUNCTIONAL_ASSESSMENT: 0-10

## 2024-02-26 ASSESSMENT — PAIN SCALES - GENERAL: PAINLEVEL_OUTOF10: 2

## 2024-02-26 NOTE — PROGRESS NOTES
"Physical Therapy      Physical Therapy Treatment     Patient Name: Jj Avitia  MRN: [id number]  Today's Date: 2/26/2024  Time Calculation  Start Time: 0950  Stop Time: 1045  Time Calculation (min): 55 min      Visit: 10  Auth: No auth required    Assessment:     Doing well with progression of RC strengthening and shoulder stabilizing exercises. No increase in shoulder pain.    Plan:   Left shoulder ROM, scapular strengthening and RC strengthening  and stabilization exercises as tolerated without aggravating shoulder pain.        Current Problem:   1. Contusion of left shoulder, initial encounter  Follow Up In Physical Therapy      2. Rotator cuff tendinitis, left  Follow Up In Physical Therapy          Subjective   Pt. Reports that he did not have any increased pain with addition of new exercises.   Pt's goal: To be pain free     Precautions:  Precautions  Precautions Comment: nonePrecautions  Precautions Comment: none     Pain:  Pain Assessment  Pain Assessment: 0-10  Pain Score: 2  Pain Location: Shoulder  Pain Orientation: Left    Objective    Left shoulder AROM   Flexion 156 deg   Abduction 155 deg  IR 80 degrees  ER 75 degrees    Left shoulder strength:   Flexion 5/5  Abduction 5/5  IR 5/5  ER 5/5    Cervical ROM:   WNL in all planes    Special tests:  + impingement left shoulder  Mild inferior instability    OUTCOME MEASURE:  Quickdash 23 (raw score)            Treatments:  EXERCISE Date 2/22/24 2/26/24      REPS      HP left shoulder  NT             Wall ladder flexion       Pulleys      Flexion 3 mins 3 mins     Pulleys      Scaption/Abduction 3 mins 3 mins     Pulleys      IR 20X 5\"H 20X            Tband       Pull down Blue 3x10 Blue  30X     Tband       Mid rows Blue 3x10 Blue  30X     Tband       IR Blue 3x10 Blue  30X     Tband       ER Blue 3x10 Blue  30X     90/90 ER and IR Next Blue  30X ea            Body blade 30\" H & V-2 positions 30\" H & V-2 positions     Wall push up with plus 30X      [facility] " [facility] I, T prone 2x10 T, W  X 10 ea            BTE ROM T 42  90\" ea T 42  90\" ea     BTE   H. Push/pull T 99 90\" ea T 108  90\"     BTE  V. Push/pull T  99  90\" ea T 108  90\"     BTE IR/ER T 36  90\" T 39  90\"                   Ball on wall-flexion 2#  A-Z 2#  A-Z     Ball on wall-scaption  2#  A-Z     Sidelying ER              Quadruped-left arm on disc       Plyotoss with left arm       CP left shoulder with IFC 5 CP only Cp 10 mins        Goals:  1.Pt will have full AROM left shoulder to facilitate return to prior functional level-4 weeks-2/22/24 progressing    2.Pt will have 5/5 left UE strength in all planes to facilitate return to prior functional level.-4 weeks-goal met    3.Improve Quickdash score to < 20-goal met    4. Pt will have normal dynamic stability of left shoulder so he can return to prior level of function-goal set 2/22/24-4 weeks    "

## 2024-02-28 ENCOUNTER — OFFICE VISIT (OUTPATIENT)
Dept: ORTHOPEDIC SURGERY | Facility: CLINIC | Age: 34
End: 2024-02-28
Payer: MEDICARE

## 2024-02-28 DIAGNOSIS — M54.12 CERVICAL RADICULOPATHY: ICD-10-CM

## 2024-02-28 DIAGNOSIS — M75.82 ROTATOR CUFF TENDINITIS, LEFT: ICD-10-CM

## 2024-02-28 DIAGNOSIS — M25.512 LEFT SHOULDER PAIN, UNSPECIFIED CHRONICITY: Primary | ICD-10-CM

## 2024-02-28 DIAGNOSIS — R20.2 TINGLING OF LEFT UPPER EXTREMITY: ICD-10-CM

## 2024-02-28 DIAGNOSIS — S40.012A CONTUSION OF LEFT SHOULDER, INITIAL ENCOUNTER: ICD-10-CM

## 2024-02-28 PROCEDURE — 99213 OFFICE O/P EST LOW 20 MIN: CPT | Performed by: ORTHOPAEDIC SURGERY
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
## 2024-03-01 ENCOUNTER — TREATMENT (OUTPATIENT)
Dept: PHYSICAL THERAPY | Facility: CLINIC | Age: 34
End: 2024-03-01
Payer: MEDICARE

## 2024-03-01 DIAGNOSIS — M75.82 ROTATOR CUFF TENDINITIS, LEFT: ICD-10-CM

## 2024-03-01 DIAGNOSIS — S40.012A CONTUSION OF LEFT SHOULDER, INITIAL ENCOUNTER: ICD-10-CM

## 2024-03-01 PROCEDURE — 97110 THERAPEUTIC EXERCISES: CPT | Mod: GP,CQ

## 2024-03-01 ASSESSMENT — PAIN SCALES - GENERAL
PAINLEVEL_OUTOF10: 2
PAINLEVEL_OUTOF10: 2

## 2024-03-01 ASSESSMENT — PAIN - FUNCTIONAL ASSESSMENT
PAIN_FUNCTIONAL_ASSESSMENT: 0-10
PAIN_FUNCTIONAL_ASSESSMENT: 0-10

## 2024-03-01 NOTE — PROGRESS NOTES
"Physical Therapy      Physical Therapy Treatment     Patient Name: jJ Avitia  MRN: [id number]  Today's Date: 3/1/2024  Time Calculation  Start Time: 1100  Stop Time: 1155  Time Calculation (min): 55 min      Visit: 11  Auth: No auth required    Assessment:   Pt. Tolerated progressed strengthening exercises well. No increased shoulder soreness at the end of his session.    Plan:   Left shoulder ROM, scapular strengthening and RC strengthening  and stabilization exercises as tolerated without aggravating shoulder pain.        Current Problem:   1. Contusion of left shoulder, initial encounter  Follow Up In Physical Therapy      2. Rotator cuff tendinitis, left  Follow Up In Physical Therapy          Subjective   Pt. reports mild shoulder soreness. He is feeling much stronger.     Pt's goal: To be pain free     Precautions:   Precautions  Precautions Comment: none     Pain:  Pain Assessment  Pain Assessment: 0-10  Pain Score: 2  Pain Location: Shoulder  Pain Orientation: Left    Objective    Left shoulder strength:   Flexion 5/5  Abduction 5/5  IR 5/5  ER 5/5    Increased resistance with strengthening exercises - see flow sheet     Treatments:  EXERCISE Date 2/22/24  2/26/24 3/1/24     REPS      HP left shoulder  NT             Wall ladder flexion       Pulleys      Flexion 3 mins 3 mins 3 min    Pulleys      Scaption/Abduction 3 mins 3 mins 3 min    Pulleys      IR 20X 5\"H 20X 20x           Tband       Pull down Blue 3x10 Blue  30X Blue 30x    Tband       Mid rows Blue 3x10 Blue  30X Blue 30x    Tband       IR Blue 3x10 Blue  30X Blue 30x    Tband       ER Blue 3x10 Blue  30X Blue 30x    90/90 ER and IR Next Blue  30X ea Blue  30x           Body blade 30\" H & V-2 positions 30\" H & V-2 positions 30\" H & V-2  positions    Wall push up with plus 30X      I T Y ([facility]) I, T prone 2x10 T, W  X 10 ea T,W x15 ea           BTE ROM T 42  90\" ea T 42  90\" ea T51  100\" ea    BTE   H. Push/pull T 99 90\" ea T 108  90\" " "T123  100\"    BTE  V. Push/pull T  99  90\" ea T 108  90\" T123 100\"    BTE IR/ER T 36  90\" T 39  90\" T 39 100\"                  Ball on wall-flexion 2#  A-Z 2#  A-Z 2# A-Z    Ball on wall-scaption  2#  A-Z 2# A-Z    Sidelying ER              Quadruped-left arm on disc       Plyotoss with left arm       CP left shoulder with IFC 5 CP only Cp 10 mins Cp 10 min       Goals:  1.Pt will have full AROM left shoulder to facilitate return to prior functional level-4 weeks-2/22/24 progressing    2.Pt will have 5/5 left UE strength in all planes to facilitate return to prior functional level.-4 weeks-goal met    3.Improve Quickdash score to < 20-goal met    4. Pt will have normal dynamic stability of left shoulder so he can return to prior level of function-goal set 2/22/24-4 weeks  "

## 2024-03-04 ENCOUNTER — TREATMENT (OUTPATIENT)
Dept: PHYSICAL THERAPY | Facility: CLINIC | Age: 34
End: 2024-03-04
Payer: MEDICARE

## 2024-03-04 DIAGNOSIS — M75.82 ROTATOR CUFF TENDINITIS, LEFT: ICD-10-CM

## 2024-03-04 DIAGNOSIS — S40.012A CONTUSION OF LEFT SHOULDER, INITIAL ENCOUNTER: ICD-10-CM

## 2024-03-04 PROCEDURE — 97110 THERAPEUTIC EXERCISES: CPT | Mod: GP,CQ

## 2024-03-04 ASSESSMENT — PAIN - FUNCTIONAL ASSESSMENT: PAIN_FUNCTIONAL_ASSESSMENT: 0-10

## 2024-03-04 ASSESSMENT — PAIN SCALES - GENERAL: PAINLEVEL_OUTOF10: 0 - NO PAIN

## 2024-03-04 NOTE — PROGRESS NOTES
"Physical Therapy      Physical Therapy Treatment     Patient Name: Jj Avitia  MRN: [id number]  Today's Date: 3/4/2024  Time Calculation  Start Time: 1205  Stop Time: 1300  Time Calculation (min): 55 min      Visit: 12  Auth: No auth required    Assessment:    Left shoulder strength and ROM are progressing nicely. Tolerating exercise progression well. No pain at the end of his session.    Plan:   Left shoulder ROM, scapular strengthening and RC strengthening  and stabilization exercises as tolerated without aggravating shoulder pain.        Current Problem:   1. Contusion of left shoulder, initial encounter  Follow Up In Physical Therapy      2. Rotator cuff tendinitis, left  Follow Up In Physical Therapy          Subjective   Pt. reports that he has no shoulder pain today. He had a \"good workout\" last PT session.      Pt's goal: To be pain free     Precautions:   Precautions  Precautions Comment: none     Pain:  Pain Assessment  Pain Assessment: 0-10  Pain Score: 0 - No pain  Pain Location: Shoulder  Pain Orientation: Left    Objective    Left shoulder strength:   Flexion 5/5  Abduction 5/5  IR 5/5  ER 5/5    Increased resistance with shoulder T-band exercises     Treatments:  EXERCISE Date 2/22/24  2/26/24 3/1/24 3/4/24    REPS      HP left shoulder  NT             Wall ladder flexion       Pulleys      Flexion 3 mins 3 mins 3 min 3 min   Pulleys      Scaption/Abduction 3 mins 3 mins 3 min 3 min   Pulleys      IR 20X 5\"H 20X 20x 20x          Tband       Pull down Blue 3x10 Blue  30X Blue 30x Blk 30x   Tband       Mid rows Blue 3x10 Blue  30X Blue 30x Blk 30x   Tband       IR Blue 3x10 Blue  30X Blue 30x Blk 2x10   Tband       ER Blue 3x10 Blue  30X Blue 30x Blk 2x10   90/90 ER and IR Next Blue  30X ea Blue  30x Blue 30x          Body blade 30\" H & V-2 positions 30\" H & V-2 positions 30\" H & V-2  positions 30\" H & V - 2 positions   Wall push up with plus 30X      I T Y ([facility]) I, T prone 2x10 T, W  X 10 ea " "T,W x15 ea T,W x15ea          BTE ROM T 42  90\" ea T 42  90\" ea T51  100\" ea T51 100\" ea   BTE   H. Push/pull T 99 90\" ea T 108  90\" T123  100\" T123 100\"   BTE  V. Push/pull T  99  90\" ea T 108  90\" T123 100\" T123 100\"   BTE IR/ER T 36  90\" T 39  90\" T 39 100\" T39 100\"                 Ball on wall-flexion 2#  A-Z 2#  A-Z 2# A-Z 2# A-Z   Ball on wall-scaption  2#  A-Z 2# A-Z 2# A-Z   Sidelying ER              Quadruped-left arm on disc       Plyotoss with left arm       CP left shoulder with IFC 5 CP only Cp 10 mins Cp 10 min Cp 10 min      Goals:  1.Pt will have full AROM left shoulder to facilitate return to prior functional level-4 weeks-2/22/24 progressing    2.Pt will have 5/5 left UE strength in all planes to facilitate return to prior functional level.-4 weeks-goal met    3.Improve Quickdash score to < 20-goal met    4. Pt will have normal dynamic stability of left shoulder so he can return to prior level of function-goal set 2/22/24-4 weeks  "

## 2024-03-07 ENCOUNTER — TREATMENT (OUTPATIENT)
Dept: PHYSICAL THERAPY | Facility: CLINIC | Age: 34
End: 2024-03-07
Payer: MEDICARE

## 2024-03-07 DIAGNOSIS — S40.012A CONTUSION OF LEFT SHOULDER, INITIAL ENCOUNTER: ICD-10-CM

## 2024-03-07 DIAGNOSIS — M75.82 ROTATOR CUFF TENDINITIS, LEFT: ICD-10-CM

## 2024-03-07 PROCEDURE — 97110 THERAPEUTIC EXERCISES: CPT | Mod: GP,CQ

## 2024-03-07 ASSESSMENT — PAIN - FUNCTIONAL ASSESSMENT: PAIN_FUNCTIONAL_ASSESSMENT: 0-10

## 2024-03-07 ASSESSMENT — PAIN SCALES - GENERAL: PAINLEVEL_OUTOF10: 0 - NO PAIN

## 2024-03-07 NOTE — PROGRESS NOTES
"Physical Therapy      Physical Therapy Treatment     Patient Name: Jj Avitia  MRN: [id number]  Today's Date: 3/7/2024  Time Calculation  Start Time: 0922  Stop Time: 1015  Time Calculation (min): 53 min      Visit: 13  Auth: No auth required    Assessment:    Left shoulder/UE fatigue at end of session. No pain.   Discontinued BTE IR/ER. Pt. reports increased shoulder discomfort from \"cracking and popping\" with this exercise today.    Plan:   Left shoulder ROM, scapular strengthening and RC strengthening  and stabilization exercises as tolerated without aggravating shoulder pain.      Current Problem:   1. Contusion of left shoulder, initial encounter  Follow Up In Physical Therapy      2. Rotator cuff tendinitis, left  Follow Up In Physical Therapy          Subjective   Pt. reports that he has no shoulder pain but it \"feels off\". He continues to have intermittent \"feeling like his shoulder is partially dislocated\".   Pt. was 7 min late to PT     Pt's goal: To be pain free     Precautions:   Precautions  Precautions Comment: none     Pain:  Pain Assessment  Pain Assessment: 0-10  Pain Score: 0 - No pain  Pain Location: Shoulder  Pain Orientation: Left    Objective    Left shoulder strength:   Flexion 5/5  Abduction 5/5  IR 5/5  ER 5/5  Added S/L shoulder ER with weight and alternating UE quadruped     Treatments:  EXERCISE Date 3/7/24        REPS      HP left shoulder               Wall ladder flexion       Pulleys      Flexion 3 mins      Pulleys      Scaption/Abduction 3 mins      Pulleys      IR 20X 5\"H             Tband       Pull down Home      Tband       Mid rows Home      Tband       IR Home      Tband       ER Home      90/90 ER and IR Blue 3x10             Body blade 45\" H & V-positions  30\" H & V- positions      Wall push up with plus 30X review     I T Y ([facility]) T,W prone 2x10 1#             BTE ROM       BTE   H. Push/pull T 123 100\" ea      BTE  V. Push/pull T 123  100\" ea      BTE IR/ER hold   " "                 Ball on wall-flexion 2#  A-Z  2x      Ball on wall-scaption 2# A-Z   2x      Sidelying ER - left 3# 30x             Quadruped-left arm on disc 10x10\"H      Plyotoss with left arm       CP left shoulder with IFC          Goals:  1.Pt will have full AROM left shoulder to facilitate return to prior functional level-4 weeks-2/22/24 progressing    2.Pt will have 5/5 left UE strength in all planes to facilitate return to prior functional level.-4 weeks-goal met    3.Improve Quickdash score to < 20-goal met    4. Pt will have normal dynamic stability of left shoulder so he can return to prior level of function-goal set 2/22/24-4 weeks  "

## 2024-03-11 ENCOUNTER — TREATMENT (OUTPATIENT)
Dept: PHYSICAL THERAPY | Facility: CLINIC | Age: 34
End: 2024-03-11
Payer: MEDICARE

## 2024-03-11 DIAGNOSIS — S40.012A CONTUSION OF LEFT SHOULDER, INITIAL ENCOUNTER: ICD-10-CM

## 2024-03-11 DIAGNOSIS — M75.82 ROTATOR CUFF TENDINITIS, LEFT: ICD-10-CM

## 2024-03-11 PROCEDURE — 97110 THERAPEUTIC EXERCISES: CPT | Mod: GP,CQ

## 2024-03-11 ASSESSMENT — PAIN SCALES - GENERAL: PAINLEVEL_OUTOF10: 0 - NO PAIN

## 2024-03-11 ASSESSMENT — PAIN - FUNCTIONAL ASSESSMENT: PAIN_FUNCTIONAL_ASSESSMENT: 0-10

## 2024-03-11 NOTE — PROGRESS NOTES
"Physical Therapy      Physical Therapy Treatment     Patient Name: Jj Avitia  MRN: [id number]  Today's Date: 3/11/2024  Time Calculation  Start Time: 0945  Stop Time: 1045  Time Calculation (min): 60 min      Visit: 14  Auth: No auth required    Assessment:    Left shoulder/UE fatigue at end of his session. No pain. Pt. tolerates progressed exercises well.     Plan:   Left shoulder ROM, scapular strengthening and RC strengthening  and stabilization exercises as tolerated without aggravating shoulder pain.      Current Problem:   1. Contusion of left shoulder, initial encounter  Follow Up In Physical Therapy      2. Rotator cuff tendinitis, left  Follow Up In Physical Therapy          Subjective   Pt. reports that his shoulder has been feeling good.     Pt's goal: To be pain free     Precautions:   Precautions  Precautions Comment: none     Pain:  Pain Assessment  Pain Assessment: 0-10  Pain Score: 0 - No pain  Pain Location: Shoulder  Pain Orientation: Left    Objective    Left shoulder strength:   Flexion 5/5  Abduction 5/5  IR 5/5  ER 5/5    Added quadruped alternating UE/LE      Treatments:  EXERCISE Date 3/7/24  3/11/24      REPS      HP left shoulder               Wall ladder flexion       Pulleys      Flexion 3 mins 3 min     Pulleys      Scaption/Abduction 3 mins 3 min     Pulleys      IR 20X 5\"H 20x 5\"H            Tband       Pull down Home      Tband       Mid rows Home      Tband       IR Home      Tband       ER Home      90/90 ER and IR Blue 3x10 Blue 30x            Body blade flexion/abduction 45\" H & V-positions  30\" H & V- positions 45\" H & V  45\" H&v     Wall push up with plus 30X review     I T Y ([facility]) T,W prone 2x10 1# 1#  20x T, W            BTE ROM  T 117 100\"     BTE   H. Push/pull T 123 100\" ea T 123  100\"     BTE  V. Push/pull T 123  100\" ea T 123  100\"     BTE IR/ER hold T 39 100\"                   Ball on wall-flexion 2#  A-Z  2x 2# 2x     Ball on wall-scaption 2# A-Z   2x 2# 2x   " "  Sidelying ER - left 3# 30x 3# 30x            Quadruped-left arm on disc 10x10\"H 10x5\"H alt. UE/LE     Plyotoss with left arm       CP left shoulder with IFC          Goals:  1.Pt will have full AROM left shoulder to facilitate return to prior functional level-4 weeks-2/22/24 progressing    2.Pt will have 5/5 left UE strength in all planes to facilitate return to prior functional level.-4 weeks-goal met    3.Improve Quickdash score to < 20-goal met    4. Pt will have normal dynamic stability of left shoulder so he can return to prior level of function-goal set 2/22/24-4 weeks  "

## 2024-03-13 ENCOUNTER — TREATMENT (OUTPATIENT)
Dept: PHYSICAL THERAPY | Facility: CLINIC | Age: 34
End: 2024-03-13
Payer: MEDICARE

## 2024-03-13 DIAGNOSIS — M75.82 ROTATOR CUFF TENDINITIS, LEFT: ICD-10-CM

## 2024-03-13 DIAGNOSIS — S40.012A CONTUSION OF LEFT SHOULDER, INITIAL ENCOUNTER: ICD-10-CM

## 2024-03-13 PROCEDURE — 97110 THERAPEUTIC EXERCISES: CPT | Mod: GP,CQ

## 2024-03-13 ASSESSMENT — PAIN - FUNCTIONAL ASSESSMENT: PAIN_FUNCTIONAL_ASSESSMENT: 0-10

## 2024-03-13 ASSESSMENT — PAIN SCALES - GENERAL: PAINLEVEL_OUTOF10: 0 - NO PAIN

## 2024-03-13 NOTE — PROGRESS NOTES
"Physical Therapy      Physical Therapy Treatment     Patient Name: Jj Avitia  MRN: [id number]  Today's Date: 3/13/2024  Time Calculation  Start Time: 0855  Stop Time: 0950  Time Calculation (min): 55 min      Visit: 15  Auth: No auth required    Assessment:    Left shoulder AROM is improving. Pts. CLAUDIA UE was fatigued at the end of his session. No increase in pain.     Plan:   Left shoulder ROM, scapular strengthening and RC strengthening  and stabilization exercises as tolerated without aggravating shoulder pain.      Current Problem:   1. Contusion of left shoulder, initial encounter  Follow Up In Physical Therapy      2. Rotator cuff tendinitis, left  Follow Up In Physical Therapy          Subjective   Pt. reports that he does not have shoulder pain but it does feel \"unstable\".  Pt. Was 10 minutes late to PT.       Pt's goal: To be pain free     Precautions:   Precautions  Precautions Comment: none     Pain:  Pain Assessment  Pain Assessment: 0-10  Pain Score: 0 - No pain  Pain Location: Shoulder  Pain Orientation: Left    Objective    Left shoulder AROM:   Flexion 170  Abduction 168       Treatments:  EXERCISE Date 3/7/24  3/11/24 3/13/24     REPS      HP left shoulder               Wall ladder flexion       Pulleys      Flexion 3 mins 3 min 3 min    Pulleys      Scaption/Abduction 3 mins 3 min 3 min    Pulleys      IR 20X 5\"H 20x 5\"H 20x5\"H           Tband       Pull down Home      Tband       Mid rows Home      Tband       IR Home      Tband       ER Home      90/90 ER and IR Blue 3x10 Blue 30x Blue 30x           Body blade flexion/abduction 45\" H & V-positions  30\" H & V- positions 45\" H & V  45\" H&v 45\" H&V  45\" H&V    Wall push up with plus 30X review     I T Y ([facility] [place]) T,W prone 2x10 1# 1#  20x T, W 1# 20x           BTE ROM  T 117 100\" T 117 100\"    BTE   H. Push/pull T 123 100\" ea T 123  100\" T 123 100\"    BTE  V. Push/pull T 123  100\" ea T 123  100\" T 123 100\"    BTE IR/ER hold T 39 100\" T39 100\"  " "                Ball on wall-flexion 2#  A-Z  2x 2# 2x 4#  2x    Ball on wall-scaption 2# A-Z   2x 2# 2x 4#  2x    Sidelying ER - left 3# 30x 3# 30x 3# 20x           Quadruped-left arm on disc 10x10\"H 10x5\"H alt. UE/LE 10x 5\"H alt UE/LE    Plyotoss with left arm       CP left shoulder with IFC          Goals:  1.Pt will have full AROM left shoulder to facilitate return to prior functional level-4 weeks-2/22/24 progressing    2.Pt will have 5/5 left UE strength in all planes to facilitate return to prior functional level.-4 weeks-goal met    3.Improve Quickdash score to < 20-goal met    4. Pt will have normal dynamic stability of left shoulder so he can return to prior level of function-goal set 2/22/24-4 weeks  "

## 2024-03-14 ENCOUNTER — HOSPITAL ENCOUNTER (OUTPATIENT)
Dept: NEUROLOGY | Facility: HOSPITAL | Age: 34
Discharge: HOME | End: 2024-03-14
Payer: MEDICARE

## 2024-03-14 DIAGNOSIS — R20.2 TINGLING OF LEFT UPPER EXTREMITY: ICD-10-CM

## 2024-03-14 PROCEDURE — 95909 NRV CNDJ TST 5-6 STUDIES: CPT | Performed by: PSYCHIATRY & NEUROLOGY

## 2024-03-14 PROCEDURE — 95886 MUSC TEST DONE W/N TEST COMP: CPT | Performed by: PSYCHIATRY & NEUROLOGY

## 2024-03-18 ENCOUNTER — APPOINTMENT (OUTPATIENT)
Dept: PHYSICAL THERAPY | Facility: CLINIC | Age: 34
End: 2024-03-18
Payer: MEDICARE

## 2024-03-19 ENCOUNTER — TELEPHONE (OUTPATIENT)
Dept: ORTHOPEDIC SURGERY | Facility: CLINIC | Age: 34
End: 2024-03-19
Payer: MEDICARE
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
## 2024-03-20 ENCOUNTER — APPOINTMENT (OUTPATIENT)
Dept: PHYSICAL THERAPY | Facility: CLINIC | Age: 34
End: 2024-03-20
Payer: MEDICARE

## 2024-03-21 ENCOUNTER — OFFICE VISIT (OUTPATIENT)
Dept: PAIN MEDICINE | Facility: HOSPITAL | Age: 34
End: 2024-03-21
Payer: MEDICARE

## 2024-03-21 VITALS
RESPIRATION RATE: 16 BRPM | HEIGHT: 70 IN | SYSTOLIC BLOOD PRESSURE: 128 MMHG | HEART RATE: 64 BPM | BODY MASS INDEX: 29.65 KG/M2 | OXYGEN SATURATION: 98 % | DIASTOLIC BLOOD PRESSURE: 90 MMHG | WEIGHT: 207.1 LBS

## 2024-03-21 DIAGNOSIS — M54.2 NECK PAIN: Primary | ICD-10-CM

## 2024-03-21 DIAGNOSIS — R20.2 TINGLING OF LEFT UPPER EXTREMITY: ICD-10-CM

## 2024-03-21 DIAGNOSIS — M25.512 LEFT SHOULDER PAIN, UNSPECIFIED CHRONICITY: ICD-10-CM

## 2024-03-21 PROBLEM — M75.82 ROTATOR CUFF TENDINITIS, LEFT: Status: RESOLVED | Noted: 2024-01-19 | Resolved: 2024-03-21

## 2024-03-21 PROCEDURE — 99204 OFFICE O/P NEW MOD 45 MIN: CPT | Performed by: PHYSICAL MEDICINE & REHABILITATION

## 2024-03-21 PROCEDURE — 99214 OFFICE O/P EST MOD 30 MIN: CPT | Performed by: PHYSICAL MEDICINE & REHABILITATION

## 2024-03-21 SDOH — SOCIAL STABILITY: SOCIAL NETWORK: SOCIAL ACTIVITY:: 2

## 2024-03-21 ASSESSMENT — COLUMBIA-SUICIDE SEVERITY RATING SCALE - C-SSRS
6. HAVE YOU EVER DONE ANYTHING, STARTED TO DO ANYTHING, OR PREPARED TO DO ANYTHING TO END YOUR LIFE?: NO
1. IN THE PAST MONTH, HAVE YOU WISHED YOU WERE DEAD OR WISHED YOU COULD GO TO SLEEP AND NOT WAKE UP?: NO
2. HAVE YOU ACTUALLY HAD ANY THOUGHTS OF KILLING YOURSELF?: NO

## 2024-03-21 ASSESSMENT — ENCOUNTER SYMPTOMS
LOSS OF SENSATION IN FEET: 0
OCCASIONAL FEELINGS OF UNSTEADINESS: 0
DEPRESSION: 0
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
## 2024-03-28 ENCOUNTER — TREATMENT (OUTPATIENT)
Dept: PHYSICAL THERAPY | Facility: CLINIC | Age: 34
End: 2024-03-28
Payer: MEDICARE

## 2024-03-28 DIAGNOSIS — S40.012A CONTUSION OF LEFT SHOULDER, INITIAL ENCOUNTER: ICD-10-CM

## 2024-03-28 DIAGNOSIS — M75.82 ROTATOR CUFF TENDINITIS, LEFT: ICD-10-CM

## 2024-03-28 PROCEDURE — 97110 THERAPEUTIC EXERCISES: CPT | Mod: GP | Performed by: PHYSICAL THERAPIST

## 2024-03-28 ASSESSMENT — PAIN - FUNCTIONAL ASSESSMENT: PAIN_FUNCTIONAL_ASSESSMENT: 0-10

## 2024-03-28 NOTE — PROGRESS NOTES
Physical Therapy      Physical Therapy Treatment/Reassessment     Patient Name: Jj Avitia  MRN: [id number]  Today's Date: 3/28/2024  Time Calculation  Start Time: 0150  Stop Time: 0221  Time Calculation (min): 31 min      Visit: 16  Auth: No auth required    Assessment:    Left shoulder AROM and strength are excellent and left shoulder special tests are negative. Pt continues to have intermittent burning pain in left scapula, shoulder and UE as well as intermittent paresthesias in left UE. Pt is having an MRI of cervical spine on 4/10/24. Pt may benefit from PT for cervical spine. Will await MRI results and proceed per MD recommendation at that time.     Plan:   Will hold PT at this time until patient has MRI results, then proceed per his MD's recommendation.      Current Problem:   1. Contusion of left shoulder, initial encounter  Follow Up In Physical Therapy      2. Rotator cuff tendinitis, left  Follow Up In Physical Therapy            Subjective   Pt. reports that his pain comes and goes. Is not sure if the pain he is feeling is coming from his shoulder or his neck. Had EMG and was referred to pain management who recommended an MRI. Pt describes burning type pain that travels from his neck to his upper arm and scapula. Has intermittent paresthesias that travel down his arm to his 4th and 5th digit on left hand. Pt reports sometimes his left shoulder pops when he is walking and swinging his left arm. Pain ranges from 0-8/10 and occasionally disturbs his sleep.         Pt's goal: To be pain free     Precautions:  Precautions  STEADI Fall Risk Score (The score of 4 or more indicates an increased risk of falling): 0  Precautions Comment: nonePrecautions  Precautions Comment: none     Pain:  Pain Assessment  Pain Assessment: 0-10  Pain Score:  (ranges from 0-8/10)  Pain Location:  (shoulder and arm)  Pain Orientation: Left    Objective    Left shoulder AROM:   Flexion 170 degrees  Abduction 172 degrees  ER 80  "degrees  IR To T7 bilat    Left shoulder strength:   Flexion: 5/5  Abduction: 5/5  IR: 5/5  ER: 5/5    Special Tests:  Impingement-negative  Empty can-negative  Spurlings-left-pain      Quickdash  23 (raw score)   Treatments:  EXERCISE Date 3/7/24  3/11/24 3/13/24  3/28/24    REPS      HP left shoulder               Wall ladder flexion       Pulleys      Flexion 3 mins 3 min 3 min    Pulleys      Scaption/Abduction 3 mins 3 min 3 min    Pulleys      IR 20X 5\"H 20x 5\"H 20x5\"H           Tband       Pull down Home      Tband       Mid rows Home      Tband       IR Home      Tband       ER Home      90/90 ER and IR Blue 3x10 Blue 30x Blue 30x           Body blade flexion/abduction 45\" H & V-positions  30\" H & V- positions 45\" H & V  45\" H&v 45\" H&V  45\" H&V    Wall push up with plus 30X review     [facility],W prone 2x10 1# 1#  20x T, W 1# 20x           BTE ROM  T 117 100\" T 117 100\"    BTE   H. Push/pull T 123 100\" ea T 123  100\" T 123 100\"    BTE  V. Push/pull T 123  100\" ea T 123  100\" T 123 100\"    BTE IR/ER hold T 39 100\" T39 100\"                  Ball on wall-flexion 2#  A-Z  2x 2# 2x 4#  2x    Ball on wall-scaption 2# A-Z   2x 2# 2x 4#  2x    Sidelying ER - left 3# 30x 3# 30x 3# 20x           Quadruped-left arm on disc 10x10\"H 10x5\"H alt. UE/LE 10x 5\"H alt UE/LE    Plyotoss with left arm       CP left shoulder with IFC          Goals:  1.Pt will have full AROM left shoulder to facilitate return to prior functional level-8 weeks-goal met    2.Pt will have 5/5 left UE strength in all planes to facilitate return to prior functional level.-4 weeks-goal met    3.Improve Quickdash score to < 20-goal not met    4. Pt will have normal dynamic stability of left shoulder so he can return to prior level of function-goal not met      "

## 2024-04-04 ENCOUNTER — APPOINTMENT (OUTPATIENT)
Dept: NEUROLOGY | Facility: HOSPITAL | Age: 34
End: 2024-04-04
Payer: MEDICARE

## 2024-04-10 ENCOUNTER — HOSPITAL ENCOUNTER (OUTPATIENT)
Dept: RADIOLOGY | Facility: HOSPITAL | Age: 34
Discharge: HOME | End: 2024-04-10
Payer: MEDICARE

## 2024-04-10 DIAGNOSIS — R20.2 TINGLING OF LEFT UPPER EXTREMITY: ICD-10-CM

## 2024-04-10 DIAGNOSIS — M54.2 NECK PAIN: ICD-10-CM

## 2024-04-10 PROCEDURE — 72141 MRI NECK SPINE W/O DYE: CPT

## 2024-04-10 PROCEDURE — 72141 MRI NECK SPINE W/O DYE: CPT | Performed by: STUDENT IN AN ORGANIZED HEALTH CARE EDUCATION/TRAINING PROGRAM

## 2024-04-12 ENCOUNTER — APPOINTMENT (OUTPATIENT)
Dept: ORTHOPEDIC SURGERY | Facility: CLINIC | Age: 34
End: 2024-04-12
Payer: MEDICARE

## 2024-05-06 ENCOUNTER — OFFICE VISIT (OUTPATIENT)
Dept: PAIN MEDICINE | Facility: HOSPITAL | Age: 34
End: 2024-05-06
Payer: MEDICARE

## 2024-05-06 VITALS
HEIGHT: 70 IN | BODY MASS INDEX: 29.78 KG/M2 | WEIGHT: 208 LBS | DIASTOLIC BLOOD PRESSURE: 83 MMHG | SYSTOLIC BLOOD PRESSURE: 122 MMHG | OXYGEN SATURATION: 98 % | RESPIRATION RATE: 16 BRPM | HEART RATE: 60 BPM

## 2024-05-06 DIAGNOSIS — M54.12 CERVICAL NEURITIS: Primary | ICD-10-CM

## 2024-05-06 PROCEDURE — 99213 OFFICE O/P EST LOW 20 MIN: CPT | Performed by: PHYSICAL MEDICINE & REHABILITATION

## 2024-05-06 ASSESSMENT — ENCOUNTER SYMPTOMS
DEPRESSION: 0
OCCASIONAL FEELINGS OF UNSTEADINESS: 0
LOSS OF SENSATION IN FEET: 0

## 2024-05-06 ASSESSMENT — PATIENT HEALTH QUESTIONNAIRE - PHQ9
2. FEELING DOWN, DEPRESSED OR HOPELESS: NOT AT ALL
SUM OF ALL RESPONSES TO PHQ9 QUESTIONS 1 AND 2: 0
1. LITTLE INTEREST OR PLEASURE IN DOING THINGS: NOT AT ALL
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
## 2024-05-15 ENCOUNTER — APPOINTMENT (OUTPATIENT)
Dept: ORTHOPEDIC SURGERY | Facility: CLINIC | Age: 34
End: 2024-05-15
Payer: MEDICARE

## 2024-08-22 ENCOUNTER — OFFICE VISIT (OUTPATIENT)
Dept: ORTHOPEDIC SURGERY | Facility: CLINIC | Age: 34
End: 2024-08-22
Payer: MEDICARE

## 2024-08-22 ENCOUNTER — APPOINTMENT (OUTPATIENT)
Dept: ORTHOPEDIC SURGERY | Facility: CLINIC | Age: 34
End: 2024-08-22
Payer: MEDICARE

## 2024-08-22 DIAGNOSIS — M54.12 CERVICAL RADICULOPATHY: ICD-10-CM

## 2024-08-22 PROCEDURE — 99213 OFFICE O/P EST LOW 20 MIN: CPT | Performed by: ORTHOPAEDIC SURGERY

## 2024-08-22 RX ORDER — METHYLPREDNISOLONE 4 MG/1
TABLET ORAL
Qty: 21 TABLET | Refills: 0 | Status: SHIPPED | OUTPATIENT
Start: 2024-08-22
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
## 2024-09-26 ENCOUNTER — EVALUATION (OUTPATIENT)
Dept: PHYSICAL THERAPY | Facility: CLINIC | Age: 34
End: 2024-09-26
Payer: MEDICARE

## 2024-09-26 DIAGNOSIS — M54.12 CERVICAL RADICULOPATHY: Primary | ICD-10-CM

## 2024-09-26 PROCEDURE — 97161 PT EVAL LOW COMPLEX 20 MIN: CPT | Mod: GP

## 2024-09-26 PROCEDURE — 97110 THERAPEUTIC EXERCISES: CPT | Mod: GP

## 2024-09-26 PROCEDURE — 97140 MANUAL THERAPY 1/> REGIONS: CPT | Mod: GP

## 2024-09-26 ASSESSMENT — PAIN SCALES - GENERAL: PAINLEVEL_OUTOF10: 3

## 2024-09-26 ASSESSMENT — ENCOUNTER SYMPTOMS
DEPRESSION: 0
LOSS OF SENSATION IN FEET: 0
OCCASIONAL FEELINGS OF UNSTEADINESS: 0

## 2024-09-26 ASSESSMENT — PAIN - FUNCTIONAL ASSESSMENT: PAIN_FUNCTIONAL_ASSESSMENT: 0-10

## 2024-09-26 ASSESSMENT — PATIENT HEALTH QUESTIONNAIRE - PHQ9
SUM OF ALL RESPONSES TO PHQ9 QUESTIONS 1 AND 2: 0
2. FEELING DOWN, DEPRESSED OR HOPELESS: NOT AT ALL
1. LITTLE INTEREST OR PLEASURE IN DOING THINGS: NOT AT ALL
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
## 2024-10-01 ENCOUNTER — TREATMENT (OUTPATIENT)
Dept: PHYSICAL THERAPY | Facility: CLINIC | Age: 34
End: 2024-10-01
Payer: MEDICARE

## 2024-10-01 DIAGNOSIS — M54.12 CERVICAL RADICULOPATHY: ICD-10-CM

## 2024-10-01 PROCEDURE — 97110 THERAPEUTIC EXERCISES: CPT | Mod: GP,CQ | Performed by: PHYSICAL THERAPY ASSISTANT

## 2024-10-01 ASSESSMENT — PAIN - FUNCTIONAL ASSESSMENT: PAIN_FUNCTIONAL_ASSESSMENT: 0-10

## 2024-10-01 ASSESSMENT — PAIN SCALES - GENERAL: PAINLEVEL_OUTOF10: 5 - MODERATE PAIN
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
## 2024-10-04 ENCOUNTER — APPOINTMENT (OUTPATIENT)
Dept: PHYSICAL THERAPY | Facility: CLINIC | Age: 34
End: 2024-10-04
Payer: MEDICARE

## 2024-10-08 ENCOUNTER — TREATMENT (OUTPATIENT)
Dept: PHYSICAL THERAPY | Facility: CLINIC | Age: 34
End: 2024-10-08
Payer: MEDICARE

## 2024-10-08 ENCOUNTER — APPOINTMENT (OUTPATIENT)
Dept: PHYSICAL THERAPY | Facility: CLINIC | Age: 34
End: 2024-10-08
Payer: MEDICARE

## 2024-10-08 DIAGNOSIS — M54.12 CERVICAL RADICULOPATHY: ICD-10-CM

## 2024-10-08 PROCEDURE — 97140 MANUAL THERAPY 1/> REGIONS: CPT | Mod: GP

## 2024-10-08 PROCEDURE — 97110 THERAPEUTIC EXERCISES: CPT | Mod: GP

## 2024-10-08 ASSESSMENT — PAIN SCALES - GENERAL: PAINLEVEL_OUTOF10: 4

## 2024-10-08 ASSESSMENT — PAIN - FUNCTIONAL ASSESSMENT: PAIN_FUNCTIONAL_ASSESSMENT: 0-10

## 2024-10-08 NOTE — PROGRESS NOTES
"Physical Therapy    Physical Therapy Treatment    Patient Name: Jj Avitia  MRN: [id number]  : 1990  Today's Date: 10/8/2024  Time Calculation  Start Time: 1205  Stop Time: 1255  Time Calculation (min): 50 min    PT Therapeutic Procedures Time Entry  Manual Therapy Time Entry: 10  Therapeutic Exercise Time Entry: 30     Non-Billable Time  Non-billable time: 10  Non-billable time reason: HP on neck/shoulder    VISIT:# 3    Current Problem  Problem List Items Addressed This Visit    None  Visit Diagnoses         Codes    Cervical radiculopathy     M54.12             Subjective    Patient reports their shoulder is less painful than last visit and that they ordered a heating pad to use at home for pain management.     Precautions  Precautions  Precautions Comment: none       Pain  Pain Assessment: 0-10  0-10 (Numeric) Pain Score: 4  Pain Location: Shoulder  Pain Orientation: Left       Objective       Continue ther ex per flow sheet.         Treatments:  EXERCISE Date 10/1/24 Date 10/8/24 Date Date    REPS REPS REPS REPS          Pulleys      Flexion 3' 3'     Pulleys      Scaption/Abduction 3' 3'            Tband       Pull down Blue x 25 Black x 25     Tband       Skis Blue x 25 Black x 25     Tband       Mid rows Blue x 25 Black x 25            BTE ROM  T-96 60\" each way     BTE V Push/Pull  T-120 60\"      BTE H Push/Pull  T-120 60\"     BTE IR/ER  T-81 100\"                    Cerv SB stretch 10\" x 10 \      Cerv rotation stretch 10\" x 10  \    HEP      Chin tucks 10\" x 10  /      Pec stretch  10\" x 10 /      Cervical distraction and retraction w/ towel  10\" x 10  (added to HEP)                   Mechanical Traction 12#/2# 30/10 x 12'      Manual Traction  10 min                                 HP   10'        Assessment:   Patient tolerated progression of treatment well during today's visit. Patient reports their pain reduced from being rated 4/10 to 2/10 after traction.     Plan:  Assess response to " initial session and effects of mechanical traction and proceed accordingly.    Treatment/Interventions: Cryotherapy, Dry needling, Electrical stimulation, Hot pack, Manual therapy, Mechanical traction, Therapeutic exercises, Ultrasound     Goals: by week 4  1) Decrease pain in neck and L shoulder to <2/10 to allow for more comfort performing their ADLs - progessing    2) Improve cervical ROM with all measure to be WNL to improve functional capacity - progressing

## 2024-10-11 ENCOUNTER — APPOINTMENT (OUTPATIENT)
Dept: PHYSICAL THERAPY | Facility: CLINIC | Age: 34
End: 2024-10-11
Payer: MEDICARE

## 2024-10-15 ENCOUNTER — TREATMENT (OUTPATIENT)
Dept: PHYSICAL THERAPY | Facility: CLINIC | Age: 34
End: 2024-10-15
Payer: MEDICARE

## 2024-10-15 DIAGNOSIS — M54.12 CERVICAL RADICULOPATHY: Primary | ICD-10-CM

## 2024-10-15 PROCEDURE — 97110 THERAPEUTIC EXERCISES: CPT | Mod: GP

## 2024-10-15 ASSESSMENT — PAIN SCALES - GENERAL: PAINLEVEL_OUTOF10: 2

## 2024-10-15 ASSESSMENT — PAIN - FUNCTIONAL ASSESSMENT: PAIN_FUNCTIONAL_ASSESSMENT: 0-10

## 2024-10-15 NOTE — PROGRESS NOTES
"Physical Therapy    Physical Therapy Treatment (Discharge)    Patient Name: Jj Avitia  MRN: [id number]  : 1990  Today's Date: 10/15/2024  Time Calculation  Start Time: 0820  Stop Time: 0850  Time Calculation (min): 30 min    PT Therapeutic Procedures Time Entry  Therapeutic Exercise Time Entry: 30          VISIT:# 4    Current Problem  Problem List Items Addressed This Visit    None  Visit Diagnoses         Codes    Cervical radiculopathy    -  Primary M54.12             Subjective    The patient reports he has not experienced any radiating pain in the past few days.      Precautions  Precautions  Precautions Comment: none       Pain  Pain Assessment: 0-10  0-10 (Numeric) Pain Score: 2  Pain Location: Neck       Objective   Other Measures  Neck Disability Index: 14    Cervical AROM (degrees)  FB = 45  BB = 65  R SB = 45  L SB = 36  R Rot = 61  L Rot = 58      No pain with any of the above movements     Treatments:  EXERCISE Date 10/1/24 Date 10/8/24 Date 10/15/24 Date    REPS REPS REPS REPS      Discharge 30'    Pulleys      Flexion 3' 3'     Pulleys      Scaption/Abduction 3' 3'            Tband       Pull down Blue x 25 Black x 25     Tband       Skis Blue x 25 Black x 25     Tband       Mid rows Blue x 25 Black x 25            BTE ROM  T-96 60\" each way     BTE V Push/Pull  T-120 60\"      BTE H Push/Pull  T-120 60\"     BTE IR/ER  T-81 100\"                    Cerv SB stretch 10\" x 10 \      Cerv rotation stretch 10\" x 10  \    HEP      Chin tucks 10\" x 10  /      Pec stretch  10\" x 10 /      Cervical distraction and retraction w/ towel  10\" x 10  (added to HEP)                   Mechanical Traction 12#/2# 30/10 x 12'      Manual Traction  10 min                                 HP   10'        Assessment:   The patient demonstrates pain free cervical ROM at this time.  His radiating symptoms have started to centralize.  He will continue with his HEP and will discharge from PT today.    Plan:   " Discharge from PT today.  Thank you for your referral.     Goals: by week 4  1) Decrease pain in neck and L shoulder to <2/10 to allow for more comfort performing their ADLs - partially met    2) Improve cervical ROM with all measure to be WNL to improve functional capacity - goal met

## 2024-10-17 ENCOUNTER — APPOINTMENT (OUTPATIENT)
Dept: PHYSICAL THERAPY | Facility: CLINIC | Age: 34
End: 2024-10-17
Payer: MEDICARE

## 2024-10-21 ENCOUNTER — APPOINTMENT (OUTPATIENT)
Dept: PHYSICAL THERAPY | Facility: CLINIC | Age: 34
End: 2024-10-21
Payer: MEDICARE

## 2024-10-24 ENCOUNTER — APPOINTMENT (OUTPATIENT)
Dept: PHYSICAL THERAPY | Facility: CLINIC | Age: 34
End: 2024-10-24
Payer: MEDICARE

## 2024-10-28 ENCOUNTER — APPOINTMENT (OUTPATIENT)
Dept: PHYSICAL THERAPY | Facility: CLINIC | Age: 34
End: 2024-10-28
Payer: MEDICARE

## 2024-10-31 ENCOUNTER — APPOINTMENT (OUTPATIENT)
Dept: PHYSICAL THERAPY | Facility: CLINIC | Age: 34
End: 2024-10-31
Payer: MEDICARE

## 2024-12-02 ENCOUNTER — OFFICE VISIT (OUTPATIENT)
Dept: PAIN MEDICINE | Facility: HOSPITAL | Age: 34
End: 2024-12-02
Payer: MEDICARE

## 2024-12-02 VITALS
HEIGHT: 70 IN | RESPIRATION RATE: 16 BRPM | WEIGHT: 206 LBS | HEART RATE: 75 BPM | DIASTOLIC BLOOD PRESSURE: 87 MMHG | OXYGEN SATURATION: 98 % | SYSTOLIC BLOOD PRESSURE: 119 MMHG | BODY MASS INDEX: 29.49 KG/M2

## 2024-12-02 DIAGNOSIS — M54.12 CERVICAL NEURITIS: ICD-10-CM

## 2024-12-02 DIAGNOSIS — M54.2 NECK PAIN: Primary | ICD-10-CM

## 2024-12-02 PROCEDURE — 99213 OFFICE O/P EST LOW 20 MIN: CPT | Performed by: PHYSICAL MEDICINE & REHABILITATION

## 2024-12-02 PROCEDURE — 3008F BODY MASS INDEX DOCD: CPT | Performed by: PHYSICAL MEDICINE & REHABILITATION

## 2024-12-02 ASSESSMENT — COLUMBIA-SUICIDE SEVERITY RATING SCALE - C-SSRS
1. IN THE PAST MONTH, HAVE YOU WISHED YOU WERE DEAD OR WISHED YOU COULD GO TO SLEEP AND NOT WAKE UP?: NO
2. HAVE YOU ACTUALLY HAD ANY THOUGHTS OF KILLING YOURSELF?: NO
6. HAVE YOU EVER DONE ANYTHING, STARTED TO DO ANYTHING, OR PREPARED TO DO ANYTHING TO END YOUR LIFE?: NO

## 2024-12-02 ASSESSMENT — ENCOUNTER SYMPTOMS
WEAKNESS: 0
JOINT SWELLING: 0
ARTHRALGIAS: 0
FEVER: 0
DEPRESSION: 0
LOSS OF SENSATION IN FEET: 0
FATIGUE: 0
NUMBNESS: 0
OCCASIONAL FEELINGS OF UNSTEADINESS: 0
NECK STIFFNESS: 1
ACTIVITY CHANGE: 0
NAUSEA: 0
COLOR CHANGE: 0
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
## 2025-03-17 ENCOUNTER — APPOINTMENT (OUTPATIENT)
Dept: PRIMARY CARE | Facility: CLINIC | Age: 35
End: 2025-03-17
Payer: MEDICARE

## 2025-03-17 VITALS
HEIGHT: 71 IN | DIASTOLIC BLOOD PRESSURE: 70 MMHG | OXYGEN SATURATION: 98 % | WEIGHT: 199 LBS | SYSTOLIC BLOOD PRESSURE: 116 MMHG | HEART RATE: 73 BPM | TEMPERATURE: 97.8 F | BODY MASS INDEX: 27.86 KG/M2

## 2025-03-17 DIAGNOSIS — M54.12 CERVICAL NEURITIS: ICD-10-CM

## 2025-03-17 DIAGNOSIS — Z00.00 WELL ADULT EXAM: ICD-10-CM

## 2025-03-17 DIAGNOSIS — G47.00 INSOMNIA, UNSPECIFIED TYPE: ICD-10-CM

## 2025-03-17 DIAGNOSIS — R10.12 LEFT UPPER QUADRANT ABDOMINAL PAIN: Primary | ICD-10-CM

## 2025-03-17 PROBLEM — F90.0 ADHD, PREDOMINANTLY INATTENTIVE TYPE: Status: ACTIVE | Noted: 2025-03-17

## 2025-03-17 PROCEDURE — 3008F BODY MASS INDEX DOCD: CPT | Performed by: FAMILY MEDICINE

## 2025-03-17 PROCEDURE — 99385 PREV VISIT NEW AGE 18-39: CPT | Performed by: FAMILY MEDICINE

## 2025-03-17 PROCEDURE — 1036F TOBACCO NON-USER: CPT | Performed by: FAMILY MEDICINE

## 2025-03-17 PROCEDURE — 99203 OFFICE O/P NEW LOW 30 MIN: CPT | Performed by: FAMILY MEDICINE

## 2025-03-17 ASSESSMENT — ENCOUNTER SYMPTOMS: ABDOMINAL PAIN: 1

## 2025-03-17 NOTE — PROGRESS NOTES
Subjective   Jj Avitia is a 34 y.o. male and is here for a comprehensive physical exam. The patient reports problems -   .  Exercising 0 to 7 times a week  Has issues with neck pain. Was seeing someone thru  dr lopresti and dr nicole lawson.  Now working with [employer] and getting shots.  Has a pain on the left side. Started 2 yrs ago. Worse when anxious. Sometimes feels like behind his rib cage on the left.  Was a  for 8 yrs and he drank way too much. Has been sober for over a year.  Do you take any herbs or supplements that were not prescribed by a doctor? no  Are you taking calcium supplements? no  Are you taking aspirin daily? no      History:  Patient receives prostate care outside our clinic  Date last prostate exam: not age appropriate  Date last PSA: see above    Do you have pain that bothers you in your daily life? yes  Review of Systems   Gastrointestinal:  Positive for abdominal pain.        Luq and mid epigastric pain   All other systems reviewed and are negative.       Objective   Physical Exam  Constitutional:       Appearance: Normal appearance.   HENT:      Head: Normocephalic.      Right Ear: Tympanic membrane normal.      Nose: Nose normal.      Mouth/Throat:      Mouth: Mucous membranes are moist.   Eyes:      Pupils: Pupils are equal, round, and reactive to light.   Cardiovascular:      Rate and Rhythm: Normal rate and regular rhythm.      Pulses: Normal pulses.   Pulmonary:      Effort: Pulmonary effort is normal.   Abdominal:      General: Abdomen is flat.   Musculoskeletal:         General: Normal range of motion.      Cervical back: Normal range of motion.   Skin:     General: Skin is warm.   Neurological:      Mental Status: He is alert.   Psychiatric:         Mood and Affect: Mood normal.         Assessment/Plan   Healthy male exam.      1.   Problem List Items Addressed This Visit       Cervical neuritis    Insomnia     Other Visit Diagnoses       Left upper  quadrant abdominal pain    -  Primary    Relevant Orders    CT abdomen wo IV contrast    Well adult exam        Relevant Orders    Urinalysis with Reflex Microscopic    Lipid Panel    CBC and Auto Differential    Thyroid Stimulating Hormone    Comprehensive Metabolic Panel    CT abdomen wo IV contrast         Cervical neuritis- cont working with specialist  Insomnia- due to above  2. Patient Counseling:  --Nutrition: Stressed importance of moderation in sodium/caffeine intake, saturated fat and cholesterol, caloric balance, sufficient intake of fresh fruits, vegetables, fiber    --Exercise: Stressed the importance of regular exercise.   --Substance Abuse:sober for 1 to 2 years   --Sexuality: Discussed sexually transmitted diseases, partner selection, use of condoms    --Dental health: Discussed importance of regular tooth brushing, flossing, and dental visits.  --Immunizations reviewed.  --  --After hours service discussed with patient  3. Discussed the patient's BMI with him.  The BMI 27  4. Follow up as needed

## 2025-03-18 LAB
ALBUMIN SERPL-MCNC: 4.6 G/DL (ref 3.6–5.1)
ALP SERPL-CCNC: 78 U/L (ref 36–130)
ALT SERPL-CCNC: 40 U/L (ref 9–46)
ANION GAP SERPL CALCULATED.4IONS-SCNC: 7 MMOL/L (CALC) (ref 7–17)
APPEARANCE UR: CLEAR
AST SERPL-CCNC: 30 U/L (ref 10–40)
BASOPHILS # BLD AUTO: 50 CELLS/UL (ref 0–200)
BASOPHILS NFR BLD AUTO: 1 %
BILIRUB SERPL-MCNC: 0.6 MG/DL (ref 0.2–1.2)
BILIRUB UR QL STRIP: NEGATIVE
BUN SERPL-MCNC: 16 MG/DL (ref 7–25)
CALCIUM SERPL-MCNC: 9.3 MG/DL (ref 8.6–10.3)
CHLORIDE SERPL-SCNC: 104 MMOL/L (ref 98–110)
CHOLEST SERPL-MCNC: 228 MG/DL
CHOLEST/HDLC SERPL: 3.7 (CALC)
CO2 SERPL-SCNC: 29 MMOL/L (ref 20–32)
COLOR UR: YELLOW
CREAT SERPL-MCNC: 1.04 MG/DL (ref 0.6–1.26)
EGFRCR SERPLBLD CKD-EPI 2021: 95 ML/MIN/1.73M2
EOSINOPHIL # BLD AUTO: 280 CELLS/UL (ref 15–500)
EOSINOPHIL NFR BLD AUTO: 5.6 %
ERYTHROCYTE [DISTWIDTH] IN BLOOD BY AUTOMATED COUNT: 11.9 % (ref 11–15)
GLUCOSE SERPL-MCNC: 89 MG/DL (ref 65–99)
GLUCOSE UR QL STRIP: NEGATIVE
HCT VFR BLD AUTO: 45.6 % (ref 38.5–50)
HDLC SERPL-MCNC: 61 MG/DL
HGB BLD-MCNC: 15.2 G/DL (ref 13.2–17.1)
HGB UR QL STRIP: NEGATIVE
KETONES UR QL STRIP: NEGATIVE
LDLC SERPL CALC-MCNC: 147 MG/DL (CALC)
LEUKOCYTE ESTERASE UR QL STRIP: NEGATIVE
LYMPHOCYTES # BLD AUTO: 1935 CELLS/UL (ref 850–3900)
LYMPHOCYTES NFR BLD AUTO: 38.7 %
MCH RBC QN AUTO: 29.7 PG (ref 27–33)
MCHC RBC AUTO-ENTMCNC: 33.3 G/DL (ref 32–36)
MCV RBC AUTO: 89.2 FL (ref 80–100)
MONOCYTES # BLD AUTO: 465 CELLS/UL (ref 200–950)
MONOCYTES NFR BLD AUTO: 9.3 %
NEUTROPHILS # BLD AUTO: 2270 CELLS/UL (ref 1500–7800)
NEUTROPHILS NFR BLD AUTO: 45.4 %
NITRITE UR QL STRIP: NEGATIVE
NONHDLC SERPL-MCNC: 167 MG/DL (CALC)
PH UR STRIP: 5.5 [PH] (ref 5–8)
PLATELET # BLD AUTO: 217 THOUSAND/UL (ref 140–400)
PMV BLD REES-ECKER: 9.6 FL (ref 7.5–12.5)
POTASSIUM SERPL-SCNC: 4.9 MMOL/L (ref 3.5–5.3)
PROT SERPL-MCNC: 6.9 G/DL (ref 6.1–8.1)
PROT UR QL STRIP: NEGATIVE
RBC # BLD AUTO: 5.11 MILLION/UL (ref 4.2–5.8)
SODIUM SERPL-SCNC: 140 MMOL/L (ref 135–146)
SP GR UR STRIP: 1.03 (ref 1–1.03)
TRIGL SERPL-MCNC: 95 MG/DL
TSH SERPL-ACNC: 1.18 MIU/L (ref 0.4–4.5)
WBC # BLD AUTO: 5 THOUSAND/UL (ref 3.8–10.8)

## 2025-03-19 PROBLEM — E78.2 MIXED HYPERLIPIDEMIA: Status: ACTIVE | Noted: 2025-03-19

## 2025-03-31 ENCOUNTER — TELEPHONE (OUTPATIENT)
Dept: PRIMARY CARE | Facility: CLINIC | Age: 35
End: 2025-03-31
Payer: MEDICAID

## 2025-03-31 NOTE — TELEPHONE ENCOUNTER
Abdomen CT - [facility] called for prior auth on Tuesday 4/25/25 - Prior auth pending -   I see that patient had completed on 3/28/25 - it clearly states on the results that auth is still pending.   They have asked for an ultrasound first before approval . It ihas not officially been denied as of today - but assume it will be.

## 2025-04-03 ENCOUNTER — TELEPHONE (OUTPATIENT)
Dept: PRIMARY CARE | Facility: CLINIC | Age: 35
End: 2025-04-03
Payer: MEDICAID

## 2025-04-03 ENCOUNTER — NURSE TRIAGE (OUTPATIENT)
Dept: PRIMARY CARE | Facility: CLINIC | Age: 35
End: 2025-04-03

## 2025-04-03 DIAGNOSIS — R10.12 LEFT UPPER QUADRANT PAIN: ICD-10-CM

## 2025-04-03 NOTE — TELEPHONE ENCOUNTER
----- Message from Opal Nash sent at 4/3/2025  3:16 PM EDT -----  nl  ----- Message -----  From: Ameena Hood  Sent: 4/3/2025   7:40 AM EDT  To: Opal Nash MD    Patient calling asking about these results

## 2025-04-04 DIAGNOSIS — R10.12 LEFT UPPER QUADRANT ABDOMINAL PAIN: Primary | ICD-10-CM

## 2025-05-21 LAB
AMYLASE SERPL-CCNC: 42 U/L (ref 21–101)
LIPASE SERPL-CCNC: 27 U/L (ref 7–60)

## 2025-06-26 ENCOUNTER — APPOINTMENT (OUTPATIENT)
Dept: PRIMARY CARE | Facility: CLINIC | Age: 35
End: 2025-06-26
Payer: MEDICAID

## 2025-06-26 VITALS
RESPIRATION RATE: 18 BRPM | WEIGHT: 191.2 LBS | HEART RATE: 81 BPM | OXYGEN SATURATION: 97 % | TEMPERATURE: 97.8 F | DIASTOLIC BLOOD PRESSURE: 90 MMHG | BODY MASS INDEX: 26.77 KG/M2 | SYSTOLIC BLOOD PRESSURE: 126 MMHG | HEIGHT: 71 IN

## 2025-06-26 DIAGNOSIS — E78.2 MIXED HYPERLIPIDEMIA: Primary | ICD-10-CM

## 2025-06-26 DIAGNOSIS — R10.12 LEFT UPPER QUADRANT ABDOMINAL PAIN: ICD-10-CM

## 2025-06-26 PROCEDURE — 99213 OFFICE O/P EST LOW 20 MIN: CPT | Performed by: FAMILY MEDICINE

## 2025-06-26 PROCEDURE — 3008F BODY MASS INDEX DOCD: CPT | Performed by: FAMILY MEDICINE

## 2025-06-26 PROCEDURE — 1036F TOBACCO NON-USER: CPT | Performed by: FAMILY MEDICINE

## 2025-06-26 ASSESSMENT — PATIENT HEALTH QUESTIONNAIRE - PHQ9
1. LITTLE INTEREST OR PLEASURE IN DOING THINGS: NOT AT ALL
SUM OF ALL RESPONSES TO PHQ9 QUESTIONS 1 AND 2: 0
2. FEELING DOWN, DEPRESSED OR HOPELESS: NOT AT ALL

## 2025-06-26 ASSESSMENT — ENCOUNTER SYMPTOMS: ABDOMINAL PAIN: 1
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
## 2025-08-08 ENCOUNTER — OFFICE VISIT (OUTPATIENT)
Facility: CLINIC | Age: 35
End: 2025-08-08
Payer: MEDICAID

## 2025-08-08 VITALS
BODY MASS INDEX: 26.32 KG/M2 | DIASTOLIC BLOOD PRESSURE: 78 MMHG | SYSTOLIC BLOOD PRESSURE: 121 MMHG | HEART RATE: 65 BPM | HEIGHT: 71 IN | WEIGHT: 188 LBS

## 2025-08-08 DIAGNOSIS — R10.12 LUQ PAIN: Primary | ICD-10-CM

## 2025-08-08 PROCEDURE — 3008F BODY MASS INDEX DOCD: CPT | Performed by: INTERNAL MEDICINE

## 2025-08-08 PROCEDURE — 99204 OFFICE O/P NEW MOD 45 MIN: CPT | Performed by: INTERNAL MEDICINE

## 2025-08-08 NOTE — PROGRESS NOTES
[facility]    ASSESSMENT and PLAN:            Assessment & Plan  LUQ pain  History and exam consistent with MSK source of pain.  There are no enteric cues in comparison to the somatic nature of his complaint.  I cannot explain the report of relief of the discomfort by drinking water.  The patient is interested in evaluating further from the GI standpoint and we will schedule EGD.  Encouraged to follow-up with his primary care provider as this is very unlikely to represent pain of GI origin.  Orders:    Esophagogastroduodenoscopy (EGD); Future               Faisal Monique III, MD, MHA, FACP, FACG, TED, AGAF    Attending Physician, Gastroenterology    [facility]            Subjective   HISTORY OF PRESENT ILLNESS:     Chief Complaint  New Patient Visit (LUQ pain for a couple years - CT done 3/28, labs done 5/21/no prior scopes - referred by Dr. Nash.)    History Of Present Illness:    Jj Avitia is a 35 y.o. male with a significant past medical history of 2 years of LUQ pain who presents for consultation requested by his primary care provider (Opal Nash MD) for the evaluation of same.  Describes a sharp aching pain along the left lower rib margin.  Has been present for 2 years and initially felt to be GI related and he was given a course of therapy for an ulcer.  The pain has not changed over time and he has had imaging that have been unremarkable.  The patient states stress can aggravate the pain and drinking water can relieve it.  Patient denies any heartburn/GERD, N/V, dysphagia, odynophagia, abdominal pain, diarrhea, constipation, hematemesis, hematochezia, melena, or weight loss.    Endoscopy History:    None    Review of systems:   Review of Systems    I performed a complete 10 point review of systems and it is negative except as noted in HPI or above.      PAST HISTORIES:       Past Medical History:  Problem  "List[1]  He has a past medical history of Peptic ulceration (2024) and Varicella (1994).    Past Surgical History:  He has a past surgical history that includes [place] tooth extraction; Pilonidal cyst drainage; and Pilonidal cyst drainage.      Social History:  He reports that he has quit smoking. His smoking use included cigarettes. He has never used smokeless tobacco. He reports that he does not currently use alcohol. He reports that he does not currently use drugs.    Family History:  No known family history of GI disease, specifically denies any family history of pancreatitis, Crohn's, colon cancer, gastroesophageal cancer, or ulcerative colitis.    Family History[2]     Allergies:  Patient has no known allergies.      Objective   OBJECTIVE:       Last Recorded Vitals:  Vitals:    08/08/25 1035   BP: 121/78   Pulse: 65   Weight: 85.3 kg (188 lb)   Height: 1.803 m (5' 11\")     /78   Pulse 65   Ht 1.803 m (5' 11\")   Wt 85.3 kg (188 lb)   BMI 26.22 kg/m²      Physical Exam:    Physical Exam  Physical Exam:  Pleasant obese in no apparent distress  Alert and oriented x 3   HEENT: Normocephalic atraumatic.  Extraocular movements intact.  No scleral icterus.  Oropharynx slightly dry.  Clear no exudate.  Neck: Supple, full range of movement  CV: RRR, no murmurs appreciated  Lungs: CTA bilaterally.  His pain is reproducible with deep palpation of the lower rib margin left midclavicular line and over the eighth ninth ribs.  Abd: soft, obese, normal active bowel sounds, NT, ND   Ext: no lower extremity edema, cyanosis or clubbing  Skin: No jaundice or rashes   Home Medications:  Prior to Admission medications   Medication Sig Start Date End Date Taking? Authorizing Provider   methylPREDNISolone (Medrol Dospak) 4 mg tablets Use as directed by package instructions  Patient not taking: Reported on 3/17/2025 8/22/24   Michael A Lopresti, MD         Relevant Results Recent labs reviewed in the EMR.    Lab Results "   Component Value Date/Time    WBC 5.0 03/18/2025 0820    HGB 15.2 03/18/2025 0820    MCV 89.2 03/18/2025 0820     03/18/2025 0820       Lab Results   Component Value Date/Time     03/18/2025 0820    K 4.9 03/18/2025 0820     03/18/2025 0820    BUN 16 03/18/2025 0820    CREATININE 1.04 03/18/2025 0820       Lab Results   Component Value Date/Time    BILITOT 0.6 03/18/2025 0820    ALKPHOS 78 03/18/2025 0820    AST 30 03/18/2025 0820    ALT 40 03/18/2025 0820    LIPASE 27 05/21/2025 0835         Radiology: Imaging reviewed in the EMR.  Imaging  No results found.    Cardiology, Vascular, and Other Imaging  No other imaging results found for the past 7 days                   [1]   Patient Active Problem List  Diagnosis    Neck pain    Cervical neuritis    ADHD, predominantly inattentive type    Insomnia    Mixed hyperlipidemia   [2]   Family History  Problem Relation Name Age of Onset    Parkinsonism Mother Charlotte Avitia     Hypertension Mother Charlotte Avitia     Emphysema Father Balwinder Avitia Sr     COPD Father Balwinder Avitia Sr     Diabetes Father Balwinder Avitia Sr     Cancer Maternal Grandfather Yohan Sanchez Sr     Alcohol abuse Paternal Grandfather Dean Adore

## 2025-08-28 ENCOUNTER — APPOINTMENT (OUTPATIENT)
Facility: CLINIC | Age: 35
End: 2025-08-28
Payer: MEDICAID

## 2025-08-28 ENCOUNTER — ANESTHESIA EVENT (OUTPATIENT)
Dept: GASTROENTEROLOGY | Facility: HOSPITAL | Age: 35
End: 2025-08-28
Payer: MEDICAID

## 2025-08-28 ENCOUNTER — ANESTHESIA (OUTPATIENT)
Dept: GASTROENTEROLOGY | Facility: HOSPITAL | Age: 35
End: 2025-08-28
Payer: MEDICAID

## 2025-08-28 ENCOUNTER — HOSPITAL ENCOUNTER (OUTPATIENT)
Dept: GASTROENTEROLOGY | Facility: HOSPITAL | Age: 35
Discharge: HOME | End: 2025-08-28
Payer: MEDICAID

## 2025-08-28 VITALS
DIASTOLIC BLOOD PRESSURE: 80 MMHG | SYSTOLIC BLOOD PRESSURE: 111 MMHG | TEMPERATURE: 97.4 F | RESPIRATION RATE: 14 BRPM | OXYGEN SATURATION: 98 % | HEART RATE: 56 BPM

## 2025-08-28 DIAGNOSIS — R10.12 LUQ PAIN: ICD-10-CM

## 2025-08-28 PROCEDURE — 2500000004 HC RX 250 GENERAL PHARMACY W/ HCPCS (ALT 636 FOR OP/ED): Performed by: NURSE ANESTHETIST, CERTIFIED REGISTERED

## 2025-08-28 PROCEDURE — 2500000004 HC RX 250 GENERAL PHARMACY W/ HCPCS (ALT 636 FOR OP/ED): Performed by: INTERNAL MEDICINE

## 2025-08-28 PROCEDURE — 7100000010 HC PHASE TWO TIME - EACH INCREMENTAL 1 MINUTE

## 2025-08-28 PROCEDURE — 3700000001 HC GENERAL ANESTHESIA TIME - INITIAL BASE CHARGE

## 2025-08-28 PROCEDURE — 43239 EGD BIOPSY SINGLE/MULTIPLE: CPT | Performed by: INTERNAL MEDICINE

## 2025-08-28 PROCEDURE — 7100000009 HC PHASE TWO TIME - INITIAL BASE CHARGE

## 2025-08-28 PROCEDURE — 3700000002 HC GENERAL ANESTHESIA TIME - EACH INCREMENTAL 1 MINUTE

## 2025-08-28 RX ORDER — PANTOPRAZOLE SODIUM 40 MG/1
40 TABLET, DELAYED RELEASE ORAL
Status: DISCONTINUED | OUTPATIENT
Start: 2025-08-29 | End: 2025-08-29 | Stop reason: HOSPADM

## 2025-08-28 RX ORDER — LIDOCAINE HCL/PF 100 MG/5ML
SYRINGE (ML) INTRAVENOUS AS NEEDED
Status: DISCONTINUED | OUTPATIENT
Start: 2025-08-28 | End: 2025-08-28

## 2025-08-28 RX ORDER — PROPOFOL 10 MG/ML
INJECTION, EMULSION INTRAVENOUS AS NEEDED
Status: DISCONTINUED | OUTPATIENT
Start: 2025-08-28 | End: 2025-08-28

## 2025-08-28 RX ORDER — SODIUM CHLORIDE 9 MG/ML
20 INJECTION, SOLUTION INTRAVENOUS CONTINUOUS
Status: ACTIVE | OUTPATIENT
Start: 2025-08-28 | End: 2025-08-28

## 2025-08-28 RX ORDER — FENTANYL CITRATE 50 UG/ML
INJECTION, SOLUTION INTRAMUSCULAR; INTRAVENOUS AS NEEDED
Status: DISCONTINUED | OUTPATIENT
Start: 2025-08-28 | End: 2025-08-28

## 2025-08-28 RX ADMIN — LIDOCAINE HYDROCHLORIDE 50 MG: 20 INJECTION, SOLUTION INTRAVENOUS at 11:21

## 2025-08-28 RX ADMIN — FENTANYL CITRATE 50 MCG: 50 INJECTION INTRAMUSCULAR; INTRAVENOUS at 11:22

## 2025-08-28 RX ADMIN — PROPOFOL 200 MG: 10 INJECTION, EMULSION INTRAVENOUS at 11:21

## 2025-08-28 RX ADMIN — FENTANYL CITRATE 50 MCG: 50 INJECTION INTRAMUSCULAR; INTRAVENOUS at 11:21

## 2025-08-28 RX ADMIN — LIDOCAINE HYDROCHLORIDE 50 MG: 20 INJECTION, SOLUTION INTRAVENOUS at 11:22

## 2025-08-28 RX ADMIN — SODIUM CHLORIDE 20 ML/HR: 0.9 INJECTION, SOLUTION INTRAVENOUS at 10:19

## 2025-08-28 SDOH — HEALTH STABILITY: MENTAL HEALTH: CURRENT SMOKER: 0

## 2025-08-28 ASSESSMENT — PAIN - FUNCTIONAL ASSESSMENT
PAIN_FUNCTIONAL_ASSESSMENT: 0-10

## 2025-08-28 ASSESSMENT — PAIN SCALES - GENERAL
PAINLEVEL_OUTOF10: 0 - NO PAIN

## 2025-09-04 LAB
LABORATORY COMMENT REPORT: NORMAL
PATH REPORT.FINAL DX SPEC: NORMAL
PATH REPORT.GROSS SPEC: NORMAL
PATH REPORT.RELEVANT HX SPEC: NORMAL
PATH REPORT.TOTAL CANCER: NORMAL